# Patient Record
Sex: FEMALE | Race: WHITE | HISPANIC OR LATINO
[De-identification: names, ages, dates, MRNs, and addresses within clinical notes are randomized per-mention and may not be internally consistent; named-entity substitution may affect disease eponyms.]

---

## 2018-04-21 PROBLEM — Z00.00 ENCOUNTER FOR PREVENTIVE HEALTH EXAMINATION: Status: ACTIVE | Noted: 2018-04-21

## 2018-05-21 ENCOUNTER — ASOB RESULT (OUTPATIENT)
Age: 28
End: 2018-05-21

## 2018-05-21 ENCOUNTER — APPOINTMENT (OUTPATIENT)
Dept: OBGYN | Facility: CLINIC | Age: 28
End: 2018-05-21
Payer: MEDICAID

## 2018-05-21 VITALS
WEIGHT: 150 LBS | DIASTOLIC BLOOD PRESSURE: 70 MMHG | BODY MASS INDEX: 29.45 KG/M2 | HEIGHT: 60 IN | SYSTOLIC BLOOD PRESSURE: 130 MMHG

## 2018-05-21 DIAGNOSIS — N92.6 IRREGULAR MENSTRUATION, UNSPECIFIED: ICD-10-CM

## 2018-05-21 DIAGNOSIS — Z87.59 PERSONAL HISTORY OF OTHER COMPLICATIONS OF PREGNANCY, CHILDBIRTH AND THE PUERPERIUM: ICD-10-CM

## 2018-05-21 DIAGNOSIS — F19.90 OTHER PSYCHOACTIVE SUBSTANCE USE, UNSPECIFIED, UNCOMPLICATED: ICD-10-CM

## 2018-05-21 PROCEDURE — 76857 US EXAM PELVIC LIMITED: CPT

## 2018-05-21 PROCEDURE — 99385 PREV VISIT NEW AGE 18-39: CPT

## 2018-05-21 PROCEDURE — 99213 OFFICE O/P EST LOW 20 MIN: CPT | Mod: 25

## 2018-05-21 PROCEDURE — 76830 TRANSVAGINAL US NON-OB: CPT

## 2018-05-21 PROCEDURE — XXXXX: CPT

## 2018-05-21 RX ORDER — ALBUTEROL SULFATE 90 UG/1
108 (90 BASE) AEROSOL, METERED RESPIRATORY (INHALATION)
Refills: 0 | Status: ACTIVE | COMMUNITY

## 2018-05-22 LAB — HPV HIGH+LOW RISK DNA PNL CVX: NOT DETECTED

## 2018-05-25 LAB — CYTOLOGY CVX/VAG DOC THIN PREP: NORMAL

## 2018-06-01 ENCOUNTER — APPOINTMENT (OUTPATIENT)
Dept: OBGYN | Facility: CLINIC | Age: 28
End: 2018-06-01
Payer: MEDICAID

## 2018-06-01 VITALS — WEIGHT: 150 LBS | BODY MASS INDEX: 27.6 KG/M2 | HEIGHT: 62 IN

## 2018-06-01 PROCEDURE — 99214 OFFICE O/P EST MOD 30 MIN: CPT

## 2018-07-16 ENCOUNTER — OTHER (OUTPATIENT)
Age: 28
End: 2018-07-16

## 2018-09-05 ENCOUNTER — APPOINTMENT (OUTPATIENT)
Dept: OBGYN | Facility: CLINIC | Age: 28
End: 2018-09-05
Payer: MEDICAID

## 2018-09-05 VITALS — BODY MASS INDEX: 27.6 KG/M2 | WEIGHT: 150 LBS | HEIGHT: 62 IN

## 2018-09-05 PROCEDURE — 99213 OFFICE O/P EST LOW 20 MIN: CPT

## 2018-09-20 ENCOUNTER — APPOINTMENT (OUTPATIENT)
Dept: OBGYN | Facility: CLINIC | Age: 28
End: 2018-09-20
Payer: MEDICAID

## 2018-09-20 VITALS
SYSTOLIC BLOOD PRESSURE: 102 MMHG | DIASTOLIC BLOOD PRESSURE: 68 MMHG | HEIGHT: 62 IN | BODY MASS INDEX: 34.6 KG/M2 | WEIGHT: 188 LBS

## 2018-09-20 DIAGNOSIS — N91.1 SECONDARY AMENORRHEA: ICD-10-CM

## 2018-09-20 LAB
HCG UR QL: POSITIVE
QUALITY CONTROL: YES

## 2018-09-20 PROCEDURE — 81025 URINE PREGNANCY TEST: CPT

## 2018-09-20 PROCEDURE — 99213 OFFICE O/P EST LOW 20 MIN: CPT

## 2018-09-28 ENCOUNTER — APPOINTMENT (OUTPATIENT)
Dept: OBGYN | Facility: CLINIC | Age: 28
End: 2018-09-28
Payer: MEDICAID

## 2018-09-28 ENCOUNTER — ASOB RESULT (OUTPATIENT)
Age: 28
End: 2018-09-28

## 2018-09-28 VITALS
WEIGHT: 188 LBS | SYSTOLIC BLOOD PRESSURE: 110 MMHG | HEIGHT: 62 IN | DIASTOLIC BLOOD PRESSURE: 88 MMHG | BODY MASS INDEX: 34.6 KG/M2

## 2018-09-28 DIAGNOSIS — O26.20 PREGNANCY CARE FOR PATIENT WITH RECURRENT PREGNANCY LOSS, UNSPECIFIED TRIMESTER: ICD-10-CM

## 2018-09-28 PROCEDURE — 99213 OFFICE O/P EST LOW 20 MIN: CPT

## 2018-10-03 PROBLEM — O26.20 HABITUAL ABORTION HISTORY, ANTEPARTUM: Status: ACTIVE | Noted: 2018-10-03

## 2018-10-26 ENCOUNTER — APPOINTMENT (OUTPATIENT)
Dept: OBGYN | Facility: CLINIC | Age: 28
End: 2018-10-26
Payer: MEDICAID

## 2018-10-26 DIAGNOSIS — N30.00 ACUTE CYSTITIS W/OUT HEMATURIA: ICD-10-CM

## 2018-10-26 PROCEDURE — 81003 URINALYSIS AUTO W/O SCOPE: CPT | Mod: QW

## 2018-10-26 PROCEDURE — 99213 OFFICE O/P EST LOW 20 MIN: CPT

## 2018-10-27 LAB
BILIRUB UR QL STRIP: NORMAL
CLARITY UR: CLEAR
COLLECTION METHOD: NORMAL
GLUCOSE UR-MCNC: NORMAL
HCG UR QL: 0.2 EU/DL
HGB UR QL STRIP.AUTO: NORMAL
KETONES UR-MCNC: NORMAL
LEUKOCYTE ESTERASE UR QL STRIP: NORMAL
NITRITE UR QL STRIP: NORMAL
PH UR STRIP: 7.5
PROT UR STRIP-MCNC: NORMAL
SP GR UR STRIP: 1.02

## 2018-11-09 ENCOUNTER — APPOINTMENT (OUTPATIENT)
Dept: OBGYN | Facility: CLINIC | Age: 28
End: 2018-11-09

## 2019-04-26 ENCOUNTER — APPOINTMENT (OUTPATIENT)
Dept: OBGYN | Facility: CLINIC | Age: 29
End: 2019-04-26
Payer: MEDICAID

## 2019-04-26 PROCEDURE — 36415 COLL VENOUS BLD VENIPUNCTURE: CPT

## 2019-04-29 LAB
HCG SERPL QL: POSITIVE
PAPP-A SERPL-ACNC: 642 MIU/ML

## 2019-06-03 ENCOUNTER — APPOINTMENT (OUTPATIENT)
Dept: OBGYN | Facility: CLINIC | Age: 29
End: 2019-06-03
Payer: MEDICAID

## 2019-06-03 VITALS
BODY MASS INDEX: 35.51 KG/M2 | SYSTOLIC BLOOD PRESSURE: 110 MMHG | HEIGHT: 62 IN | WEIGHT: 193 LBS | DIASTOLIC BLOOD PRESSURE: 62 MMHG

## 2019-06-03 PROCEDURE — 36415 COLL VENOUS BLD VENIPUNCTURE: CPT

## 2019-06-03 PROCEDURE — 99214 OFFICE O/P EST MOD 30 MIN: CPT | Mod: 25

## 2019-06-03 PROCEDURE — 76817 TRANSVAGINAL US OBSTETRIC: CPT

## 2019-06-05 LAB
HCG SERPL QL: POSITIVE
PAPP-A SERPL-ACNC: 54 MIU/ML

## 2019-06-05 NOTE — DISCUSSION/SUMMARY
[FreeTextEntry1] : a28-year-old  female came to the office for confirmation of pregnancy patient had multiple miscarriages and an ectopic pregnancy on exam today the patient has no abdominal tenderness states that she has been bleeding for the last 3 days with cramping ultrasound showed an empty uterus no masses on the adnexa but there is free fluid advised the patient that the mother beta-hCG needs to be done and she Z. of either having a complete AB or an ectopic blood drawn today was managed accordingly instructions given no heavy lifting and no sexual relations I spent 25 minutes with

## 2019-06-14 ENCOUNTER — APPOINTMENT (OUTPATIENT)
Dept: OBGYN | Facility: CLINIC | Age: 29
End: 2019-06-14

## 2019-06-19 ENCOUNTER — APPOINTMENT (OUTPATIENT)
Dept: GYNECOLOGIC ONCOLOGY | Facility: CLINIC | Age: 29
End: 2019-06-19
Payer: MEDICAID

## 2019-06-19 ENCOUNTER — INPATIENT (INPATIENT)
Facility: HOSPITAL | Age: 29
LOS: 4 days | Discharge: HOME | DRG: 818 | End: 2019-06-24
Attending: OBSTETRICS & GYNECOLOGY | Admitting: OBSTETRICS & GYNECOLOGY
Payer: MEDICAID

## 2019-06-19 VITALS
WEIGHT: 192.9 LBS | OXYGEN SATURATION: 100 % | SYSTOLIC BLOOD PRESSURE: 119 MMHG | RESPIRATION RATE: 22 BRPM | HEIGHT: 60 IN | TEMPERATURE: 99 F | HEART RATE: 91 BPM | DIASTOLIC BLOOD PRESSURE: 86 MMHG

## 2019-06-19 DIAGNOSIS — Z83.3 FAMILY HISTORY OF DIABETES MELLITUS: ICD-10-CM

## 2019-06-19 DIAGNOSIS — N83.202 UNSPECIFIED OVARIAN CYST, LEFT SIDE: ICD-10-CM

## 2019-06-19 PROCEDURE — ZZZZZ: CPT

## 2019-06-19 PROCEDURE — 99285 EMERGENCY DEPT VISIT HI MDM: CPT | Mod: 25

## 2019-06-19 RX ORDER — CLOMIPHENE CITRATE 50 MG/1
50 TABLET ORAL
Qty: 5 | Refills: 3 | Status: DISCONTINUED | COMMUNITY
Start: 2018-09-05 | End: 2019-06-19

## 2019-06-19 RX ORDER — METFORMIN HYDROCHLORIDE 500 MG/1
500 TABLET, COATED ORAL DAILY
Qty: 90 | Refills: 3 | Status: DISCONTINUED | COMMUNITY
Start: 2018-06-01 | End: 2019-06-19

## 2019-06-19 RX ORDER — CIPROFLOXACIN HYDROCHLORIDE 500 MG/1
500 TABLET, FILM COATED ORAL TWICE DAILY
Qty: 10 | Refills: 1 | Status: DISCONTINUED | COMMUNITY
Start: 2018-10-26 | End: 2019-06-19

## 2019-06-19 RX ORDER — CHLORHEXIDINE GLUCONATE 4 %
325 (65 FE) LIQUID (ML) TOPICAL DAILY
Qty: 90 | Refills: 3 | Status: DISCONTINUED | COMMUNITY
Start: 2018-09-28 | End: 2019-06-19

## 2019-06-19 RX ORDER — CLOMIPHENE CITRATE 50 MG/1
50 TABLET ORAL
Qty: 5 | Refills: 0 | Status: DISCONTINUED | COMMUNITY
Start: 2018-06-01 | End: 2019-06-19

## 2019-06-19 NOTE — ED ADULT TRIAGE NOTE - CHIEF COMPLAINT QUOTE
Hx of enlarged left ovary diagnosed one week ago, told to follow up with GYN, but to come back if pain worsens

## 2019-06-20 ENCOUNTER — FORM ENCOUNTER (OUTPATIENT)
Age: 29
End: 2019-06-20

## 2019-06-20 DIAGNOSIS — N83.9 NONINFLAMMATORY DISORDER OF OVARY, FALLOPIAN TUBE AND BROAD LIGAMENT, UNSPECIFIED: ICD-10-CM

## 2019-06-20 LAB
ALBUMIN SERPL ELPH-MCNC: 4.4 G/DL — SIGNIFICANT CHANGE UP (ref 3.3–5.2)
ALP SERPL-CCNC: 67 U/L — SIGNIFICANT CHANGE UP (ref 40–120)
ALT FLD-CCNC: 15 U/L — SIGNIFICANT CHANGE UP
ANION GAP SERPL CALC-SCNC: 11 MMOL/L — SIGNIFICANT CHANGE UP (ref 5–17)
ANION GAP SERPL CALC-SCNC: 13 MMOL/L — SIGNIFICANT CHANGE UP (ref 5–17)
APPEARANCE UR: CLEAR — SIGNIFICANT CHANGE UP
APTT BLD: 36.9 SEC — HIGH (ref 27.5–36.3)
AST SERPL-CCNC: 14 U/L — SIGNIFICANT CHANGE UP
BASOPHILS # BLD AUTO: 0 K/UL — SIGNIFICANT CHANGE UP (ref 0–0.2)
BASOPHILS # BLD AUTO: 0 K/UL — SIGNIFICANT CHANGE UP (ref 0–0.2)
BASOPHILS NFR BLD AUTO: 0.2 % — SIGNIFICANT CHANGE UP (ref 0–2)
BASOPHILS NFR BLD AUTO: 0.3 % — SIGNIFICANT CHANGE UP (ref 0–2)
BILIRUB SERPL-MCNC: 0.3 MG/DL — LOW (ref 0.4–2)
BILIRUB UR-MCNC: NEGATIVE — SIGNIFICANT CHANGE UP
BLD GP AB SCN SERPL QL: SIGNIFICANT CHANGE UP
BUN SERPL-MCNC: 10 MG/DL — SIGNIFICANT CHANGE UP (ref 8–20)
BUN SERPL-MCNC: 8 MG/DL — SIGNIFICANT CHANGE UP (ref 8–20)
CALCIUM SERPL-MCNC: 9.1 MG/DL — SIGNIFICANT CHANGE UP (ref 8.6–10.2)
CALCIUM SERPL-MCNC: 9.6 MG/DL — SIGNIFICANT CHANGE UP (ref 8.6–10.2)
CANCER AG125 SERPL-ACNC: 38 U/ML — SIGNIFICANT CHANGE UP
CANCER AG19-9 SERPL-ACNC: 3 U/ML — SIGNIFICANT CHANGE UP
CEA SERPL-MCNC: <0.6 NG/ML — SIGNIFICANT CHANGE UP (ref 0–3.8)
CHLORIDE SERPL-SCNC: 102 MMOL/L — SIGNIFICANT CHANGE UP (ref 98–107)
CHLORIDE SERPL-SCNC: 102 MMOL/L — SIGNIFICANT CHANGE UP (ref 98–107)
CO2 SERPL-SCNC: 23 MMOL/L — SIGNIFICANT CHANGE UP (ref 22–29)
CO2 SERPL-SCNC: 25 MMOL/L — SIGNIFICANT CHANGE UP (ref 22–29)
COLOR SPEC: YELLOW — SIGNIFICANT CHANGE UP
CREAT SERPL-MCNC: 0.44 MG/DL — LOW (ref 0.5–1.3)
CREAT SERPL-MCNC: 0.51 MG/DL — SIGNIFICANT CHANGE UP (ref 0.5–1.3)
DENV1 AB SER-ACNC: 146 UG/DL — SIGNIFICANT CHANGE UP (ref 65–380)
DIFF PNL FLD: ABNORMAL
EOSINOPHIL # BLD AUTO: 0.2 K/UL — SIGNIFICANT CHANGE UP (ref 0–0.5)
EOSINOPHIL # BLD AUTO: 0.3 K/UL — SIGNIFICANT CHANGE UP (ref 0–0.5)
EOSINOPHIL NFR BLD AUTO: 2.3 % — SIGNIFICANT CHANGE UP (ref 0–6)
EOSINOPHIL NFR BLD AUTO: 2.8 % — SIGNIFICANT CHANGE UP (ref 0–6)
EPI CELLS # UR: SIGNIFICANT CHANGE UP
GLUCOSE SERPL-MCNC: 77 MG/DL — SIGNIFICANT CHANGE UP (ref 70–115)
GLUCOSE SERPL-MCNC: 84 MG/DL — SIGNIFICANT CHANGE UP (ref 70–115)
GLUCOSE UR QL: NEGATIVE MG/DL — SIGNIFICANT CHANGE UP
HCG SERPL-ACNC: <4 MIU/ML — SIGNIFICANT CHANGE UP
HCG UR QL: NEGATIVE — SIGNIFICANT CHANGE UP
HCT VFR BLD CALC: 36.6 % — LOW (ref 37–47)
HCT VFR BLD CALC: 37.8 % — SIGNIFICANT CHANGE UP (ref 37–47)
HGB BLD-MCNC: 12 G/DL — SIGNIFICANT CHANGE UP (ref 12–16)
HGB BLD-MCNC: 12.2 G/DL — SIGNIFICANT CHANGE UP (ref 12–16)
INR BLD: 1.07 RATIO — SIGNIFICANT CHANGE UP (ref 0.88–1.16)
KETONES UR-MCNC: NEGATIVE — SIGNIFICANT CHANGE UP
LDH SERPL L TO P-CCNC: 165 U/L — SIGNIFICANT CHANGE UP (ref 98–192)
LEUKOCYTE ESTERASE UR-ACNC: NEGATIVE — SIGNIFICANT CHANGE UP
LYMPHOCYTES # BLD AUTO: 2.2 K/UL — SIGNIFICANT CHANGE UP (ref 1–4.8)
LYMPHOCYTES # BLD AUTO: 21.9 % — SIGNIFICANT CHANGE UP (ref 20–55)
LYMPHOCYTES # BLD AUTO: 26.7 % — SIGNIFICANT CHANGE UP (ref 20–55)
LYMPHOCYTES # BLD AUTO: 3 K/UL — SIGNIFICANT CHANGE UP (ref 1–4.8)
MCHC RBC-ENTMCNC: 29.3 PG — SIGNIFICANT CHANGE UP (ref 27–31)
MCHC RBC-ENTMCNC: 29.7 PG — SIGNIFICANT CHANGE UP (ref 27–31)
MCHC RBC-ENTMCNC: 32.3 G/DL — SIGNIFICANT CHANGE UP (ref 32–36)
MCHC RBC-ENTMCNC: 32.8 G/DL — SIGNIFICANT CHANGE UP (ref 32–36)
MCV RBC AUTO: 90.6 FL — SIGNIFICANT CHANGE UP (ref 81–99)
MCV RBC AUTO: 90.9 FL — SIGNIFICANT CHANGE UP (ref 81–99)
MONOCYTES # BLD AUTO: 0.5 K/UL — SIGNIFICANT CHANGE UP (ref 0–0.8)
MONOCYTES # BLD AUTO: 0.5 K/UL — SIGNIFICANT CHANGE UP (ref 0–0.8)
MONOCYTES NFR BLD AUTO: 4.2 % — SIGNIFICANT CHANGE UP (ref 3–10)
MONOCYTES NFR BLD AUTO: 4.6 % — SIGNIFICANT CHANGE UP (ref 3–10)
NEUTROPHILS # BLD AUTO: 7.1 K/UL — SIGNIFICANT CHANGE UP (ref 1.8–8)
NEUTROPHILS # BLD AUTO: 7.4 K/UL — SIGNIFICANT CHANGE UP (ref 1.8–8)
NEUTROPHILS NFR BLD AUTO: 65.7 % — SIGNIFICANT CHANGE UP (ref 37–73)
NEUTROPHILS NFR BLD AUTO: 70.8 % — SIGNIFICANT CHANGE UP (ref 37–73)
NITRITE UR-MCNC: NEGATIVE — SIGNIFICANT CHANGE UP
PH UR: 7 — SIGNIFICANT CHANGE UP (ref 5–8)
PLATELET # BLD AUTO: 400 K/UL — SIGNIFICANT CHANGE UP (ref 150–400)
PLATELET # BLD AUTO: 405 K/UL — HIGH (ref 150–400)
POTASSIUM SERPL-MCNC: 4.2 MMOL/L — SIGNIFICANT CHANGE UP (ref 3.5–5.3)
POTASSIUM SERPL-MCNC: 4.2 MMOL/L — SIGNIFICANT CHANGE UP (ref 3.5–5.3)
POTASSIUM SERPL-SCNC: 4.2 MMOL/L — SIGNIFICANT CHANGE UP (ref 3.5–5.3)
POTASSIUM SERPL-SCNC: 4.2 MMOL/L — SIGNIFICANT CHANGE UP (ref 3.5–5.3)
PROT SERPL-MCNC: 8 G/DL — SIGNIFICANT CHANGE UP (ref 6.6–8.7)
PROT UR-MCNC: NEGATIVE MG/DL — SIGNIFICANT CHANGE UP
PROTHROM AB SERPL-ACNC: 12.3 SEC — SIGNIFICANT CHANGE UP (ref 10–12.9)
RBC # BLD: 4.04 M/UL — LOW (ref 4.4–5.2)
RBC # BLD: 4.16 M/UL — LOW (ref 4.4–5.2)
RBC # FLD: 13 % — SIGNIFICANT CHANGE UP (ref 11–15.6)
RBC # FLD: 13 % — SIGNIFICANT CHANGE UP (ref 11–15.6)
RBC CASTS # UR COMP ASSIST: SIGNIFICANT CHANGE UP /HPF (ref 0–4)
SODIUM SERPL-SCNC: 138 MMOL/L — SIGNIFICANT CHANGE UP (ref 135–145)
SODIUM SERPL-SCNC: 138 MMOL/L — SIGNIFICANT CHANGE UP (ref 135–145)
SP GR SPEC: 1 — LOW (ref 1.01–1.02)
UROBILINOGEN FLD QL: NEGATIVE MG/DL — SIGNIFICANT CHANGE UP
WBC # BLD: 10 K/UL — SIGNIFICANT CHANGE UP (ref 4.8–10.8)
WBC # BLD: 11.3 K/UL — HIGH (ref 4.8–10.8)
WBC # FLD AUTO: 10 K/UL — SIGNIFICANT CHANGE UP (ref 4.8–10.8)
WBC # FLD AUTO: 11.3 K/UL — HIGH (ref 4.8–10.8)
WBC UR QL: NEGATIVE — SIGNIFICANT CHANGE UP

## 2019-06-20 PROCEDURE — 76856 US EXAM PELVIC COMPLETE: CPT | Mod: 26

## 2019-06-20 PROCEDURE — 99232 SBSQ HOSP IP/OBS MODERATE 35: CPT | Mod: 57

## 2019-06-20 PROCEDURE — 72197 MRI PELVIS W/O & W/DYE: CPT | Mod: 26

## 2019-06-20 PROCEDURE — 76830 TRANSVAGINAL US NON-OB: CPT | Mod: 26

## 2019-06-20 PROCEDURE — 99223 1ST HOSP IP/OBS HIGH 75: CPT

## 2019-06-20 RX ORDER — SODIUM CHLORIDE 9 MG/ML
1000 INJECTION, SOLUTION INTRAVENOUS
Refills: 0 | Status: DISCONTINUED | OUTPATIENT
Start: 2019-06-20 | End: 2019-06-21

## 2019-06-20 RX ORDER — MULTIVIT WITH MIN/MFOLATE/K2 340-15/3 G
1 POWDER (GRAM) ORAL ONCE
Refills: 0 | Status: COMPLETED | OUTPATIENT
Start: 2019-06-20 | End: 2019-06-21

## 2019-06-20 RX ORDER — ACETAMINOPHEN 500 MG
650 TABLET ORAL ONCE
Refills: 0 | Status: COMPLETED | OUTPATIENT
Start: 2019-06-20 | End: 2019-06-20

## 2019-06-20 RX ORDER — MULTIVIT WITH MIN/MFOLATE/K2 340-15/3 G
1 POWDER (GRAM) ORAL ONCE
Refills: 0 | Status: COMPLETED | OUTPATIENT
Start: 2019-06-20 | End: 2019-06-20

## 2019-06-20 RX ORDER — SODIUM CHLORIDE 9 MG/ML
1000 INJECTION, SOLUTION INTRAVENOUS
Refills: 0 | Status: DISCONTINUED | OUTPATIENT
Start: 2019-06-20 | End: 2019-06-23

## 2019-06-20 RX ORDER — MORPHINE SULFATE 50 MG/1
2 CAPSULE, EXTENDED RELEASE ORAL EVERY 4 HOURS
Refills: 0 | Status: DISCONTINUED | OUTPATIENT
Start: 2019-06-20 | End: 2019-06-24

## 2019-06-20 RX ORDER — KETOROLAC TROMETHAMINE 30 MG/ML
15 SYRINGE (ML) INJECTION EVERY 6 HOURS
Refills: 0 | Status: DISCONTINUED | OUTPATIENT
Start: 2019-06-20 | End: 2019-06-23

## 2019-06-20 RX ADMIN — Medication 650 MILLIGRAM(S): at 13:34

## 2019-06-20 RX ADMIN — MORPHINE SULFATE 2 MILLIGRAM(S): 50 CAPSULE, EXTENDED RELEASE ORAL at 03:27

## 2019-06-20 RX ADMIN — Medication 15 MILLIGRAM(S): at 08:11

## 2019-06-20 RX ADMIN — Medication 650 MILLIGRAM(S): at 12:34

## 2019-06-20 RX ADMIN — Medication 1 BOTTLE: at 20:07

## 2019-06-20 RX ADMIN — SODIUM CHLORIDE 125 MILLILITER(S): 9 INJECTION, SOLUTION INTRAVENOUS at 03:27

## 2019-06-20 RX ADMIN — SODIUM CHLORIDE 125 MILLILITER(S): 9 INJECTION, SOLUTION INTRAVENOUS at 12:33

## 2019-06-20 RX ADMIN — Medication 15 MILLIGRAM(S): at 07:56

## 2019-06-20 NOTE — PROGRESS NOTE ADULT - SUBJECTIVE AND OBJECTIVE BOX
GYNECOLOGIC ONCOLOGY PROGRESS NOTE    HOD#1    PROBLEMS:      Pt seen and examined at bedside. US report revealing large left adnexal mass measuring 17.1 cm likely arising from the left ovary, ovarian torsion cannot be excluded. MRI recommended. Patient reports some vaginal spotting when going to the restroom. Patient also reports mild headache at this time.       SUBJECTIVE:    Patient is without complaints.  Pain well-controlled.  Flatus: Yes   Denies Nausea, Vomiting or Diarrhea.  Denies shortness of breath, chest pain or dyspnea on exertion.  Denies blurry vision or lightheadedness.   NPO    OBJECTIVE:     VITALS:  T(F): 98.7 (19 @ 07:36), Max: 98.7 (19 @ 07:36)  HR: 68 (19 @ 07:36) (68 - 91)  BP: 120/78 (19 @ 07:36) (111/62 - 120/78)  RR: 18 (19 @ 07:36) (18 - 22)  SpO2: 100% (19 @ 07:36) (97% - 100%)      I&O's Summary      MEDICATIONS  (STANDING):  acetaminophen   Tablet .. 650 milliGRAM(s) Oral once  lactated ringers. 1000 milliLiter(s) (125 mL/Hr) IV Continuous <Continuous>    MEDICATIONS  (PRN):  ketorolac   Injectable 15 milliGRAM(s) IV Push every 6 hours PRN Moderate Pain (4 - 6)  morphine  - Injectable 2 milliGRAM(s) IV Push every 4 hours PRN Severe Pain (7 - 10)      Physical Exam:  Constitutional: NAD  Pulmonary: clear to auscultation bilaterally   Cardiovascular: Regular rate and rhythm   Abdomen: soft, tenderness appreciated on palpation of the LLQ, non-distended, normal bowel sounds  Extremities: no lower extremity edema or calve tenderness, Porter's sign negative.      LABS:                        12.0   11.3  )-----------( 405      ( 2019 03:17 )             36.6     -    138  |  102  |  10.0  ----------------------------<  84  4.2   |  23.0  |  0.51    Ca    9.1      2019 03:17    TPro  8.0  /  Alb  4.4  /  TBili  0.3<L>  /  DBili  x   /  AST  14  /  ALT  15  /  AlkPhos  67  06-20    PT/INR - ( 2019 03:17 )   PT: 12.3 sec;   INR: 1.07 ratio         PTT - ( 2019 03:17 )  PTT:36.9 sec  Urinalysis Basic - ( 2019 03:22 )    Color: Yellow / Appearance: Clear / S.005 / pH: x  Gluc: x / Ketone: Negative  / Bili: Negative / Urobili: Negative mg/dL   Blood: x / Protein: Negative mg/dL / Nitrite: Negative   Leuk Esterase: Negative / RBC: 0-2 /HPF / WBC Negative   Sq Epi: x / Non Sq Epi: Occasional / Bacteria: x

## 2019-06-20 NOTE — CONSULT NOTE ADULT - SUBJECTIVE AND OBJECTIVE BOX
HPI:  27 yo  here for left lower quadrant on-and-off pain that has been going on for about 1 month. Reports that pain got worse tonight. She was seen by Dr. Rodriguez today in the office and a complex cystic mass was seen in the left adnexa on US and was recommended to come in for further workup.   From note from office today:  19 US revealed fibroid uterus, complex cystic mass seen anterior left = 97 x 65 x 81mm - minimal blood flow seen within surrounding tissue. Cystic structure seen lateral to mass = 69 x 26 x 58mm.  She was previously seen by other Gyn and was found to have + beta hcg that was downtrending. Pt reports no vaginal bleeding at this time, but just some spotting with wiping.       Gyn hx: menarche at 13 yo, history of monthly periods denies any STI, denies hx of abnormal pap smear   Last pap: may 2018 - NILM (2019 03:11)      PAST MEDICAL & SURGICAL HISTORY:  Enlarged ovary  Asthma    No significant past surgical history      Medications:      Allergies    No Known Allergies    Intolerances        SOCIAL HISTORY:  deniers habits  FAMILY HISTORY:  Family history of diabetes mellitus      Vital Signs Last 24 Hrs  T(C): 36.9 (2019 16:35), Max: 37.1 (2019 07:36)  T(F): 98.5 (2019 16:35), Max: 98.7 (2019 07:36)  HR: 70 (2019 16:35) (61 - 91)  BP: 105/71 (2019 16:35) (104/66 - 120/78)  BP(mean): --  RR: 18 (2019 16:35) (18 - 22)  SpO2: 98% (2019 16:35) (97% - 100%)    REVIEW OF SYSTEMS:    CONSTITUTIONAL: No fever, weight loss, or fatigue  EYES: No eye pain, visual disturbances, or discharge  ENMT:  No difficulty hearing, tinnitus, vertigo; No sinus or throat pain  NECK: No pain or stiffness  BREASTS: No pain, masses, or nipple discharge  RESPIRATORY: No cough, wheezing, chills or hemoptysis; No shortness of breath  CARDIOVASCULAR: No chest pain, palpitations, dizziness, or leg swelling  GASTROINTESTINAL: No abdominal or epigastric pain. No nausea, vomiting, or hematemesis; No diarrhea or constipation. No melena or hematochezia.  GENITOURINARY: No dysuria, frequency, hematuria, or incontinence  NEUROLOGICAL: No headaches, memory loss, loss of strength, numbness, or tremors  SKIN: No itching, burning, rashes, or lesions   LYMPH NODES: No enlarged glands  ENDOCRINE: No heat or cold intolerance; No hair loss  MUSCULOSKELETAL: No joint pain or swelling; No muscle, back, or extremity pain  PSYCHIATRIC: No depression, anxiety, mood swings, or difficulty sleeping  HEME/LYMPH: No easy bruising, or bleeding gums  ALLERY AND IMMUNOLOGIC: No hives or eczema    PHYSICAL EXAM:    Constitutional: NAD, well groomed and developed  Pulmonary: clear to auscultation bilaterally   Cardiovascular: Regular rate and rhythm   Abdomen: soft, tenderness appreciated on palpation of the LLQ, non-distended, normal bowel sounds  Extremities: no lower extremity edema or calf tenderness,      LABS:                        12.2   10.0  )-----------( 400      ( 2019 12:19 )             37.8     06-20    138  |  102  |  8.0  ----------------------------<  77  4.2   |  25.0  |  0.44<L>    Ca    9.6      2019 12:19    TPro  8.0  /  Alb  4.4  /  TBili  0.3<L>  /  DBili  x   /  AST  14  /  ALT  15  /  AlkPhos  67  06-20    PT/INR - ( 2019 03:17 )   PT: 12.3 sec;   INR: 1.07 ratio         PTT - ( 2019 03:17 )  PTT:36.9 sec  Urinalysis Basic - ( 2019 03:22 )    Color: Yellow / Appearance: Clear / S.005 / pH: x  Gluc: x / Ketone: Negative  / Bili: Negative / Urobili: Negative mg/dL   Blood: x / Protein: Negative mg/dL / Nitrite: Negative   Leuk Esterase: Negative / RBC: 0-2 /HPF / WBC Negative   Sq Epi: x / Non Sq Epi: Occasional / Bacteria: x        RADIOLOGY & ADDITIONAL STUDIES:    Assessment & Plan:

## 2019-06-20 NOTE — H&P ADULT - ASSESSMENT
27 yo with known ovarian mass and increasing LLQ pain with concern for torsion. Will admit to Dr. Rodriguez as previously discussed with him    Ovarian mass vs torsion  -TVUS to look at adnexa size and blood flow  - NPO   - IV pain meds  - type and screen for potential OR    Hx of + beta hCG  - repeat quantitative hCG and UCG      Prophylaxis:   - SCD for now. Will switch to lovenox vs heparin if not going to OR

## 2019-06-20 NOTE — ED PROVIDER NOTE - OBJECTIVE STATEMENT
27 y/o F pt with PMHx of enlarged ovary, asthma presents to the ED c/o . Sent to ED by Dr. Rodriguez ([id number]) to r/o torsion. Pt denies fevers/chills, ha, loc, focal neuro deficits, cp/sob/palp, cough, abd pain/n/v/d, urinary symptoms, recent travel and sick contacts. No further acute complaints at this time.  Dr. Rodriguez [phone] 27 y/o F pt with PMHx of enlarged ovary, asthma, ectopic pregnancy, 2 miscarriages presents to the ED c/o L pelvic pain for past month. Pt was followed by Dr. Rodriguez ([id number]) to r/o torsion after ultrasound showed an enlarged ovary with mass, unknown if  benign or malignant. Upon getting result was told to go to ED. Pain is constant, at times getting worse. Pt denies fevers/chills, ha, loc, focal neuro deficits, cp/sob/palp, cough, n/v/d, urinary symptoms, recent travel and sick contacts. No further acute complaints at this time.

## 2019-06-20 NOTE — ED ADULT NURSE NOTE - OBJECTIVE STATEMENT
Assumed pt care, pt lying in stretcher, no acute distress noted. Pt complaining of 10/10 LLQ pain, sent by OB/GYN for r/o Torsion. Pt states she has csyst on the L ovary and told to come to ER if pain does not subside. Pt LLQ tender to palpation, guarding, pt states she has noted vaginal bleeding, "moderate amount" for 1 hour. Assumed pt care, pt lying in stretcher, no acute distress noted. Pt complaining of 10/10 LLQ pain, sent by OB/GYN for r/o Torsion. Pt states she has "large" L ovary and told to come to ER if pain does not subside. Pt LLQ tender to palpation, guarding, pt states she has noted vaginal bleeding, "moderate amount" for 1 hour.

## 2019-06-20 NOTE — ED ADULT NURSE NOTE - NSIMPLEMENTINTERV_GEN_ALL_ED
Implemented All Universal Safety Interventions:  [place] to call system. Call bell, personal items and telephone within reach. Instruct patient to call for assistance. Room bathroom lighting operational. Non-slip footwear when patient is off stretcher. Physically safe environment: no spills, clutter or unnecessary equipment. Stretcher in lowest position, wheels locked, appropriate side rails in place.

## 2019-06-20 NOTE — ED PROVIDER NOTE - CLINICAL SUMMARY MEDICAL DECISION MAKING FREE TEXT BOX
27 y/o F pt with PMHx of enlarged ovary, asthma, ectopic pregnancy, 2 miscarriages presents to the ED c/o L pelvic pain for past month. 29 y/o F pt with PMHx of enlarged ovary, asthma, ectopic pregnancy, 2 miscarriages presents to the ED c/o L pelvic pain for past month - known left ovarian mass -  admit for further gyn management

## 2019-06-20 NOTE — ED ADULT NURSE REASSESSMENT NOTE - NS ED NURSE REASSESS COMMENT FT1
Pt handed off to RN GEORGIE in stable condition. Pt oriented to unit, plan of care explained. Call bell given to pt and call bell system explained to pt. No apparent distress noted at this time.

## 2019-06-20 NOTE — ED PROVIDER NOTE - CHPI ED SYMPTOMS NEG
no chills/no vomiting/no fever/no HA, LOC, focal neuro deficits, cough, CP/SOB/palp, abd pain/no nausea

## 2019-06-20 NOTE — H&P ADULT - NSHPPHYSICALEXAM_GEN_ALL_CORE
T(F): 97.9 (06-20-19 @ 02:26), Max: 98.6 (06-19-19 @ 23:43)  HR: 86 (06-20-19 @ 02:26) (86 - 91)  BP: 112/72 (06-20-19 @ 02:26) (112/72 - 119/86)  RR: 18 (06-20-19 @ 02:26) (18 - 22)  SpO2: 100% (06-20-19 @ 02:26) (100% - 100%)      Gen: comfortable appearing laying in bed, has difficulty sitting up due to pain  Adnexa: bandage on left adnexa with one draining sebaceous cyst   Cardio: RRR  Pulm: CTABL  Abd: soft, non-distended, has LLQ tenderness to light palpation, no rebound or guarding, + BS  Ext: difficulty lifting left leg secondary to abdominal pain, no difficulty lifting right leg, no swelling or edema noted   Pelvic: Deferred T(F): 97.9 (06-20-19 @ 02:26), Max: 98.6 (06-19-19 @ 23:43)  HR: 86 (06-20-19 @ 02:26) (86 - 91)  BP: 112/72 (06-20-19 @ 02:26) (112/72 - 119/86)  RR: 18 (06-20-19 @ 02:26) (18 - 22)  SpO2: 100% (06-20-19 @ 02:26) (100% - 100%)      Gen: comfortable appearing laying in bed, has difficulty sitting up due to pain  Neuro: A&Ox3  Adnexa: bandage on left adnexa with one draining sebaceous cyst   Cardio: RRR  Pulm: CTABL  Abd: soft, non-distended, has LLQ tenderness to light palpation, no rebound or guarding, + BS  Ext: difficulty lifting left leg secondary to abdominal pain, no difficulty lifting right leg, no swelling or edema noted   Pelvic: Deferred

## 2019-06-20 NOTE — H&P ADULT - HISTORY OF PRESENT ILLNESS
29 yo  here for left lower quadrant on-and-off pain that has been going on for about 1 month. Reports that pain got worse tonight. She was seen by Dr. Rodriguez today in the office and a complex cystic mass was seen in the left adnexa on US and was recommended to come in for further workup.     She was previously seen by other Gyn and was found to have + beta hcg that was downtrending. Pt reports no vaginal bleeding at this time, but just some spotting with wiping.       Gyn hx: menarche at 13 yo, history of monthly periods denies any STI, denies hx of abnormal pap smear   Last pap: may 2018 - NILM 27 yo  here for left lower quadrant on-and-off pain that has been going on for about 1 month. Reports that pain got worse tonight. She was seen by Dr. Rodriguez today in the office and a complex cystic mass was seen in the left adnexa on US and was recommended to come in for further workup.   From note from office today:  19 US revealed fibroid uterus, complex cystic mass seen anterior left = 97 x 65 x 81mm - minimal blood flow seen within surrounding tissue. Cystic structure seen lateral to mass = 69 x 26 x 58mm.  She was previously seen by other Gyn and was found to have + beta hcg that was downtrending. Pt reports no vaginal bleeding at this time, but just some spotting with wiping.       Gyn hx: menarche at 13 yo, history of monthly periods denies any STI, denies hx of abnormal pap smear   Last pap: may 2018 - NILM

## 2019-06-20 NOTE — ED ADULT NURSE REASSESSMENT NOTE - GENERAL PATIENT STATE
cooperative/comfortable appearance/family/SO at bedside
comfortable appearance/cooperative/family/SO at bedside

## 2019-06-20 NOTE — CONSULT NOTE ADULT - ASSESSMENT
27 yo with known ovarian mass and increasing LLQ pain with concern for torsion vs. adnexal mass.     Ovarian mass vs torsion  For OR in AM    h/o Asthma  Peak flow assessment STAT  CXR- ordered  clinically stable. no acute exacerbation    Clinically her comorbidities are stable. No clear contraindication to undergo planned procedure. Can use Duoneb treatments post operatively with incentive spirometry. Will review CXR and Peak flow when done.     will singout to MEdical consult service Dr Eubanks in AM

## 2019-06-20 NOTE — ED PROVIDER NOTE - GENITOURINARY, MLM
.Chaperone - Scribe Dyan No CMT, no vaginal discharge/bleeding, mild adnexal tenderness, no right adnexal tenderness. Chaperone - Scribe Dyan

## 2019-06-20 NOTE — PROGRESS NOTE ADULT - ASSESSMENT
27 yo with known ovarian mass and increasing LLQ pain with concern for torsion vs. adnexal mass.     Ovarian mass vs torsion  -MRI pelvis with/without contrast for further evaluation URGENT  - Tumor markers including , CEA, CA 19-9, Inhibin A/B, LDH, AFP and DHEAS ordered  - NPO  - IV pain meds    Hx of + beta hCG  - quantitative hCG and UCG negative    Prophylaxis:   - SCD for now. Will switch to lovenox vs heparin if no plan for OR.     Headache:  Tylenol 650mg x 1 now.     Above plan discussed with Dr. Rodriguez, will await further imaging and lab work to determine treatment plan.

## 2019-06-21 ENCOUNTER — RESULT REVIEW (OUTPATIENT)
Age: 29
End: 2019-06-21

## 2019-06-21 ENCOUNTER — APPOINTMENT (OUTPATIENT)
Dept: OBGYN | Facility: CLINIC | Age: 29
End: 2019-06-21

## 2019-06-21 LAB
ABO RH CONFIRMATION: SIGNIFICANT CHANGE UP
ANION GAP SERPL CALC-SCNC: 10 MMOL/L — SIGNIFICANT CHANGE UP (ref 5–17)
APTT BLD: 35.6 SEC — SIGNIFICANT CHANGE UP (ref 27.5–36.3)
BASOPHILS # BLD AUTO: 0 K/UL — SIGNIFICANT CHANGE UP (ref 0–0.2)
BASOPHILS NFR BLD AUTO: 0.2 % — SIGNIFICANT CHANGE UP (ref 0–2)
BUN SERPL-MCNC: 8 MG/DL — SIGNIFICANT CHANGE UP (ref 8–20)
CALCIUM SERPL-MCNC: 8.6 MG/DL — SIGNIFICANT CHANGE UP (ref 8.6–10.2)
CHLORIDE SERPL-SCNC: 103 MMOL/L — SIGNIFICANT CHANGE UP (ref 98–107)
CO2 SERPL-SCNC: 25 MMOL/L — SIGNIFICANT CHANGE UP (ref 22–29)
CREAT SERPL-MCNC: 0.47 MG/DL — LOW (ref 0.5–1.3)
EOSINOPHIL # BLD AUTO: 0.3 K/UL — SIGNIFICANT CHANGE UP (ref 0–0.5)
EOSINOPHIL NFR BLD AUTO: 3.1 % — SIGNIFICANT CHANGE UP (ref 0–6)
GLUCOSE BLDC GLUCOMTR-MCNC: 101 MG/DL — HIGH (ref 70–99)
GLUCOSE BLDC GLUCOMTR-MCNC: 120 MG/DL — HIGH (ref 70–99)
GLUCOSE BLDC GLUCOMTR-MCNC: 126 MG/DL — HIGH (ref 70–99)
GLUCOSE BLDC GLUCOMTR-MCNC: 93 MG/DL — SIGNIFICANT CHANGE UP (ref 70–99)
GLUCOSE BLDC GLUCOMTR-MCNC: 95 MG/DL — SIGNIFICANT CHANGE UP (ref 70–99)
GLUCOSE SERPL-MCNC: 100 MG/DL — SIGNIFICANT CHANGE UP (ref 70–115)
HCT VFR BLD CALC: 34.1 % — LOW (ref 37–47)
HGB BLD-MCNC: 11 G/DL — LOW (ref 12–16)
INR BLD: 1.08 RATIO — SIGNIFICANT CHANGE UP (ref 0.88–1.16)
LYMPHOCYTES # BLD AUTO: 2.2 K/UL — SIGNIFICANT CHANGE UP (ref 1–4.8)
LYMPHOCYTES # BLD AUTO: 23.3 % — SIGNIFICANT CHANGE UP (ref 20–55)
MCHC RBC-ENTMCNC: 29.5 PG — SIGNIFICANT CHANGE UP (ref 27–31)
MCHC RBC-ENTMCNC: 32.3 G/DL — SIGNIFICANT CHANGE UP (ref 32–36)
MCV RBC AUTO: 91.4 FL — SIGNIFICANT CHANGE UP (ref 81–99)
MONOCYTES # BLD AUTO: 0.6 K/UL — SIGNIFICANT CHANGE UP (ref 0–0.8)
MONOCYTES NFR BLD AUTO: 6.4 % — SIGNIFICANT CHANGE UP (ref 3–10)
NEUTROPHILS # BLD AUTO: 6.4 K/UL — SIGNIFICANT CHANGE UP (ref 1.8–8)
NEUTROPHILS NFR BLD AUTO: 66.8 % — SIGNIFICANT CHANGE UP (ref 37–73)
PLATELET # BLD AUTO: 362 K/UL — SIGNIFICANT CHANGE UP (ref 150–400)
POTASSIUM SERPL-MCNC: 4 MMOL/L — SIGNIFICANT CHANGE UP (ref 3.5–5.3)
POTASSIUM SERPL-SCNC: 4 MMOL/L — SIGNIFICANT CHANGE UP (ref 3.5–5.3)
PROTHROM AB SERPL-ACNC: 12.5 SEC — SIGNIFICANT CHANGE UP (ref 10–12.9)
RBC # BLD: 3.73 M/UL — LOW (ref 4.4–5.2)
RBC # FLD: 13.1 % — SIGNIFICANT CHANGE UP (ref 11–15.6)
SODIUM SERPL-SCNC: 138 MMOL/L — SIGNIFICANT CHANGE UP (ref 135–145)
WBC # BLD: 9.6 K/UL — SIGNIFICANT CHANGE UP (ref 4.8–10.8)
WBC # FLD AUTO: 9.6 K/UL — SIGNIFICANT CHANGE UP (ref 4.8–10.8)

## 2019-06-21 PROCEDURE — 99232 SBSQ HOSP IP/OBS MODERATE 35: CPT

## 2019-06-21 PROCEDURE — 71045 X-RAY EXAM CHEST 1 VIEW: CPT | Mod: 26

## 2019-06-21 PROCEDURE — 49205: CPT

## 2019-06-21 PROCEDURE — 88305 TISSUE EXAM BY PATHOLOGIST: CPT | Mod: 26

## 2019-06-21 PROCEDURE — 58140 MYOMECTOMY ABDOM METHOD: CPT | Mod: 59

## 2019-06-21 RX ORDER — FENTANYL CITRATE 50 UG/ML
50 INJECTION INTRAVENOUS
Refills: 0 | Status: DISCONTINUED | OUTPATIENT
Start: 2019-06-21 | End: 2019-06-21

## 2019-06-21 RX ORDER — ONDANSETRON 8 MG/1
4 TABLET, FILM COATED ORAL ONCE
Refills: 0 | Status: COMPLETED | OUTPATIENT
Start: 2019-06-21 | End: 2019-06-21

## 2019-06-21 RX ORDER — HYDROMORPHONE HYDROCHLORIDE 2 MG/ML
30 INJECTION INTRAMUSCULAR; INTRAVENOUS; SUBCUTANEOUS
Refills: 0 | Status: DISCONTINUED | OUTPATIENT
Start: 2019-06-21 | End: 2019-06-22

## 2019-06-21 RX ORDER — HYDROMORPHONE HYDROCHLORIDE 2 MG/ML
0.5 INJECTION INTRAMUSCULAR; INTRAVENOUS; SUBCUTANEOUS
Refills: 0 | Status: DISCONTINUED | OUTPATIENT
Start: 2019-06-21 | End: 2019-06-21

## 2019-06-21 RX ORDER — ALBUTEROL 90 UG/1
2 AEROSOL, METERED ORAL EVERY 6 HOURS
Refills: 0 | Status: DISCONTINUED | OUTPATIENT
Start: 2019-06-21 | End: 2019-06-24

## 2019-06-21 RX ORDER — SODIUM CHLORIDE 9 MG/ML
1000 INJECTION, SOLUTION INTRAVENOUS
Refills: 0 | Status: DISCONTINUED | OUTPATIENT
Start: 2019-06-21 | End: 2019-06-21

## 2019-06-21 RX ORDER — KETOROLAC TROMETHAMINE 30 MG/ML
30 SYRINGE (ML) INJECTION EVERY 6 HOURS
Refills: 0 | Status: DISCONTINUED | OUTPATIENT
Start: 2019-06-21 | End: 2019-06-22

## 2019-06-21 RX ADMIN — ONDANSETRON 4 MILLIGRAM(S): 8 TABLET, FILM COATED ORAL at 14:39

## 2019-06-21 RX ADMIN — Medication 1 BOTTLE: at 01:07

## 2019-06-21 RX ADMIN — Medication 30 MILLIGRAM(S): at 23:16

## 2019-06-21 RX ADMIN — Medication 30 MILLIGRAM(S): at 18:37

## 2019-06-21 RX ADMIN — HYDROMORPHONE HYDROCHLORIDE 30 MILLILITER(S): 2 INJECTION INTRAMUSCULAR; INTRAVENOUS; SUBCUTANEOUS at 14:45

## 2019-06-21 RX ADMIN — Medication 30 MILLIGRAM(S): at 17:28

## 2019-06-21 RX ADMIN — HYDROMORPHONE HYDROCHLORIDE 0.5 MILLIGRAM(S): 2 INJECTION INTRAMUSCULAR; INTRAVENOUS; SUBCUTANEOUS at 14:39

## 2019-06-21 RX ADMIN — HYDROMORPHONE HYDROCHLORIDE 30 MILLILITER(S): 2 INJECTION INTRAMUSCULAR; INTRAVENOUS; SUBCUTANEOUS at 16:31

## 2019-06-21 RX ADMIN — HYDROMORPHONE HYDROCHLORIDE 0.5 MILLIGRAM(S): 2 INJECTION INTRAMUSCULAR; INTRAVENOUS; SUBCUTANEOUS at 14:45

## 2019-06-21 RX ADMIN — SODIUM CHLORIDE 125 MILLILITER(S): 9 INJECTION, SOLUTION INTRAVENOUS at 23:15

## 2019-06-21 RX ADMIN — Medication 30 MILLIGRAM(S): at 23:15

## 2019-06-21 RX ADMIN — HYDROMORPHONE HYDROCHLORIDE 30 MILLILITER(S): 2 INJECTION INTRAMUSCULAR; INTRAVENOUS; SUBCUTANEOUS at 19:05

## 2019-06-21 RX ADMIN — ONDANSETRON 4 MILLIGRAM(S): 8 TABLET, FILM COATED ORAL at 23:15

## 2019-06-21 NOTE — PROGRESS NOTE ADULT - SUBJECTIVE AND OBJECTIVE BOX
GYNECOLOGIC ONCOLOGY PROGRESS NOTE    HD 2    PROBLEMS:  Ovarian mass, left      Pt seen and examined at bedside.     SUBJECTIVE:    Patient states she still has LLQ pain, but better than when admitted.   Flatus:+  Denies Nausea, Vomiting or Diarrhea.  Denies shortness of breath, chest pain or dyspnea on exertion.  NPO after midnight for surgery today    OBJECTIVE:     VITALS:  T(F): 98.3 (19 @ 08:38), Max: 98.7 (19 @ 19:28)  HR: 71 (19 @ 08:38) (61 - 74)  BP: 106/80 (19 @ 08:38) (104/66 - 130/82)  RR: 20 (19 @ 08:38) (18 - 20)  SpO2: 98% (19 @ 00:39) (98% - 100%)  Wt(kg): --    I&O's Summary    2019 07:01  -  2019 07:00  --------------------------------------------------------  IN: 1500 mL / OUT: 0 mL / NET: 1500 mL          Physical Exam:  Constitutional: NAD  Pulmonary: clear to auscultation bilaterally   Cardiovascular: Regular rate and rhythm   Abdomen: soft, slightly tender in LLQ, non-distended, normal bowel sounds  Extremities: no lower extremity edema or calve tenderness, Porter's sign negative.        LABS:                        11.0   9.6   )-----------( 362      ( 2019 08:18 )             34.1         138  |  103  |  8.0  ----------------------------<  100  4.0   |  25.0  |  0.47<L>    Ca    8.6      2019 08:18    TPro  8.0  /  Alb  4.4  /  TBili  0.3<L>  /  DBili  x   /  AST  14  /  ALT  15  /  AlkPhos  67  06-20    PT/INR - ( 2019 08:18 )   PT: 12.5 sec;   INR: 1.08 ratio         PTT - ( 2019 08:18 )  PTT:35.6 sec  Urinalysis Basic - ( 2019 03:22 )    Color: Yellow / Appearance: Clear / S.005 / pH: x  Gluc: x / Ketone: Negative  / Bili: Negative / Urobili: Negative mg/dL   Blood: x / Protein: Negative mg/dL / Nitrite: Negative   Leuk Esterase: Negative / RBC: 0-2 /HPF / WBC Negative   Sq Epi: x / Non Sq Epi: Occasional / Bacteria: x          dextrose 5% + sodium chloride 0.45%. 1000 milliLiter(s) IV Continuous <Continuous>  ketorolac   Injectable 15 milliGRAM(s) IV Push every 6 hours PRN  lactated ringers. 1000 milliLiter(s) IV Continuous <Continuous>  morphine  - Injectable 2 milliGRAM(s) IV Push every 4 hours PRN GYNECOLOGIC ONCOLOGY PROGRESS NOTE    HD 2    PROBLEMS:  Ovarian mass, left      Pt seen and examined at bedside.     SUBJECTIVE:    Patient states she still has LLQ pain, but better than when admitted.   Flatus:+  Denies Nausea, Vomiting or Diarrhea.  Denies shortness of breath, chest pain or dyspnea on exertion.  NPO after midnight for surgery today    OBJECTIVE:     VITALS:  T(F): 98.3 (19 @ 08:38), Max: 98.7 (19 @ 19:28)  HR: 71 (19 @ 08:38) (61 - 74)  BP: 106/80 (19 @ 08:38) (104/66 - 130/82)  RR: 20 (19 @ 08:38) (18 - 20)  SpO2: 98% (19 @ 00:39) (98% - 100%)  Wt(kg): --    I&O's Summary    2019 07:01  -  2019 07:00  --------------------------------------------------------  IN: 1500 mL / OUT: 0 mL / NET: 1500 mL          Physical Exam:  Constitutional: NAD  Pulmonary: clear to auscultation bilaterally   Cardiovascular: Regular rate and rhythm   Abdomen: soft, slightly tender in LLQ, non-distended, normal bowel sounds  Extremities: no lower extremity edema or calve tenderness, Porter's sign negative.        LABS:                        11.0   9.6   )-----------( 362      ( 2019 08:18 )             34.1         138  |  103  |  8.0  ----------------------------<  100  4.0   |  25.0  |  0.47<L>    Ca    8.6      2019 08:18    TPro  8.0  /  Alb  4.4  /  TBili  0.3<L>  /  DBili  x   /  AST  14  /  ALT  15  /  AlkPhos  67  06-20    PT/INR - ( 2019 08:18 )   PT: 12.5 sec;   INR: 1.08 ratio         PTT - ( 2019 08:18 )  PTT:35.6 sec  Urinalysis Basic - ( 2019 03:22 )    Color: Yellow / Appearance: Clear / S.005 / pH: x  Gluc: x / Ketone: Negative  / Bili: Negative / Urobili: Negative mg/dL   Blood: x / Protein: Negative mg/dL / Nitrite: Negative   Leuk Esterase: Negative / RBC: 0-2 /HPF / WBC Negative   Sq Epi: x / Non Sq Epi: Occasional / Bacteria: x    Carcinoembryonic Antigen: <0.6: Test Repeated   CEA Normal Ranges   _________________   Non-smoker: less than 3.9 ng/mL       Smoker: less than 5.5 ng/mL ng/mL (19 @ 18:42)    Cancer Antigen, 125 (19 @ 18:42)    Cancer Antigen, 125: 38 U/mL    Dehydroepiandrosterone-Sulfate, Serum (19 @ 18:42)    Dehydroepiandrosterone-Sulfate, Serum: 146.0: Male                            Female  Mk Stage I   7-209 ug/dL   7-126 ug/dL  Mk Stage II   ug/dL  ug/dL  Mk Stage III   ug/dL  ug/dL  Mk Stage IV&V                      ug/dL  ug/dL ug/dL                        dextrose 5% + sodium chloride 0.45%. 1000 milliLiter(s) IV Continuous <Continuous>  ketorolac   Injectable 15 milliGRAM(s) IV Push every 6 hours PRN  lactated ringers. 1000 milliLiter(s) IV Continuous <Continuous>  morphine  - Injectable 2 milliGRAM(s) IV Push every 4 hours PRN

## 2019-06-21 NOTE — PROGRESS NOTE ADULT - SUBJECTIVE AND OBJECTIVE BOX
Patient seen and examined . Kosovan speaking only ,  at bed side ( Mr Maryam Montes ) helped with translation . C/O mild LLQ pain , no n/v , no sob , no cp , no other complaints .     CC : LLQ pain / large L adnexal mass       PAST MEDICAL & SURGICAL HISTORY:  Enlarged ovary  No significant past surgical history      MEDICATIONS  (STANDING):  dextrose 5% + sodium chloride 0.45%. 1000 milliLiter(s) (125 mL/Hr) IV Continuous <Continuous>  lactated ringers. 1000 milliLiter(s) (125 mL/Hr) IV Continuous <Continuous>    MEDICATIONS  (PRN):  ketorolac   Injectable 15 milliGRAM(s) IV Push every 6 hours PRN Moderate Pain (4 - 6)  morphine  - Injectable 2 milliGRAM(s) IV Push every 4 hours PRN Severe Pain (7 - 10)      LABS:                          12.2   10.0  )-----------( 400      ( 2019 12:19 )             37.8     06-20    138  |  102  |  8.0  ----------------------------<  77  4.2   |  25.0  |  0.44<L>    Ca    9.6      2019 12:19    TPro  8.0  /  Alb  4.4  /  TBili  0.3<L>  /  DBili  x   /  AST  14  /  ALT  15  /  AlkPhos  67  06-20    PT/INR - ( 2019 03:17 )   PT: 12.3 sec;   INR: 1.07 ratio         PTT - ( 2019 03:17 )  PTT:36.9 sec  Urinalysis Basic - ( 2019 03:22 )    Color: Yellow / Appearance: Clear / S.005 / pH: x  Gluc: x / Ketone: Negative  / Bili: Negative / Urobili: Negative mg/dL   Blood: x / Protein: Negative mg/dL / Nitrite: Negative   Leuk Esterase: Negative / RBC: 0-2 /HPF / WBC Negative   Sq Epi: x / Non Sq Epi: Occasional / Bacteria: x  Pregnancy Profile, Urine (19 @ 03:22)      Pregnancy Profile, Urine: Negative: There is potential for a false-negative result for very early pregnancies  or with gestational age 5-7 weeks or above. The quantitative measurement  of serum hCG provides the most sensitive and accurate assessment of  pregnancy status.      RADIOLOGY & ADDITIONAL TESTS:  < from: MR Pelvis w/wo IV Cont (19 @ 15:00) >   EXAM:  MR PELVIS WAW IC                          PROCEDURE DATE:  2019          INTERPRETATION:  CLINICAL INFORMATION: Left adnexal mass on ultrasound.    COMPARISON: Pelvic ultrasound of the same day    PROCEDURE:   MRI of the pelvis was performed with and without intravenous contrast.  IV Contrast: Gadavist. 8 cc administered, 2 cc discarded.    FINDINGS:    UTERUS: Measures 7.7 x 4.7 x 4.8 cm Right anterior uterine body fibroid   measuring 3.8 x 3.2 x 3.4 cm.  ENDOMETRIUM: No wall thickness measuring 7 mm. A small amount of   hemorrhage is seen within the endometrial canal (series 1100 image 29).  JUNCTIONAL ZONE: Within normal limits for thickness without evidence of   adenomyosis.    RIGHT OVARY: Size measuring 2.8 x 1.7 x 2.8 cm.  LEFT OVARY: There is a lobulated multi septated cystic mass arising from   the left ovary measuring 15.1 x 9.7 x 7.6 cm (transverse x craniocaudad x   anteroposterior). The peripheral aspect of the mass demonstrates high T2   signal and the more central aspect of the mass demonstrates heterogeneous   T2 signal.    BLADDER: Within normal limits.    LYMPH NODES: No pelvic lymphadenopathy.    VISUALIZED PORTIONS:    ABDOMINAL ORGANS: Within normal limits.  BOWEL: Within normal limits.  PERITONEUM: Small amount of free fluid in the pelvis.  VESSELS: Within normal limits.  ABDOMINAL WALL: Within normal limits.  BONES: No aggressive osseous lesion.    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    Given the history of intermittent left lower quadrant pain, intermittent   torsion cannot be excluded; clinical correlation is recommended.    Small amount of pelvic ascites.    The findings were discussed with SARA Coronado at 4:18 PM on 2019.          CINDY MOISE   This document has been electronically signed. 2019  4:20PM    < end of copied text >    < from: US Pelvis Complete (19 @ 05:41) >     EXAM:  US TRANSVAGINAL                         EXAM:  US PELVIC COMPLETE                          PROCEDURE DATE:  2019          INTERPRETATION:  CLINICAL INFORMATION: Left-sided pelvic pain for one   week. History of ovarian mass on outsideultrasound.    LMP: 2019    COMPARISON: None available.    TECHNIQUE:     Endovaginal and transabdominal pelvic sonogram. Color and Spectral   Doppler was performed.    FINDINGS:    Uterus: 8.0 x 4.7 x 4.5 cm. Within normal limits.    Endometrium: 7 mm. Within normal limits.    Right ovary: 3.1 x 2.2 x 2.8 cm. Within normal limits. Normal arterial   and venous waveforms.    Left ovary: Large heterogeneous left adnexal mass measuring 16.7 x 9.0 x   17.1 cm, likely arising from the left ovary.Arterial blood flow is seen.   The mass is predominantly avascular, likely hemorrhagic content. There is   an adjacent mass which appears to arising from the uterus measuring 4.3 x   3.5 x 4.1 cm, possibly a fibroid.    Fluid: None.    IMPRESSION:     Large left adnexal mass measuring 17.1 cm, likely arising from the left   ovary; ovarian torsion cannot be excluded on the basis of this study;   clinical correlation is recommended.    In the absence of planned intervention, a pre and post IV contrast MRI of   the pelvis is recommended to further characterize the left adnexal mass.     The findings were discussed with SARA Coronado at 9:07 AM on 2019.    CINDY MOISE   This document has been electronically signed. 2019  9:09AM        < end of copied text >          REVIEW OF SYSTEMS:    LLQ pain , all other systems reviewed and are negative     Vital Signs Last 24 Hrs  T(C): 36.4 (2019 00:39), Max: 37.1 (2019 19:28)  T(F): 97.6 (2019 00:39), Max: 98.7 (2019 19:28)  HR: 74 (2019 00:39) (61 - 74)  BP: 118/75 (2019 00:39) (104/66 - 130/82)  BP(mean): --  RR: 18 (2019 00:39) (18 - 18)  SpO2: 98% (2019 00:39) (98% - 100%)    PHYSICAL EXAM:    GENERAL: NAD, well-groomed, well-developed  HEAD:  Atraumatic, Normocephalic  EYES: EOMI, PERRLA, conjunctiva and sclera clear  NECK: Supple, No JVD, Normal thyroid  NERVOUS SYSTEM:  Alert & Oriented X3, no focal deficit  CHEST/LUNG: CTA b/l ,  no  rales, rhonchi, wheezing, or rubs  HEART: Regular rate and rhythm; No murmurs, rubs, or gallops  ABDOMEN: Soft, LLQ tenderness + ,Nondistended; Bowel sounds present  EXTREMITIES:  2+ Peripheral Pulses, No clubbing, cyanosis, or edema  LYMPH: No lymphadenopathy noted  SKIN: No rashes or lesions

## 2019-06-21 NOTE — PROGRESS NOTE ADULT - ASSESSMENT
28 yrs female with Hx of mild Asthma - stable , with LLQ pain , found to have large L adnexal mass , planned for OR today .       1. LLQ pain due to large L adnexal mass - planned for OR today , keep NPO , IVF , pain meds   2. Hx of asthma - last exacerbation  12/18 - stable , uses inheler PRN , last time used 12/18 - may continue      CXR done - reviewed by me - no acute pathology

## 2019-06-21 NOTE — CHART NOTE - NSCHARTNOTEFT_GEN_A_CORE
Pt seen at bedside feeling well. Denies much pain when not moving. Using PCA. Denies n/v. Has had some juice and water to drink. Reports feeling hungry     ICU Vital Signs Last 24 Hrs  T(C): 36.9 (21 Jun 2019 17:05), Max: 37.1 (20 Jun 2019 19:28)  T(F): 98.5 (21 Jun 2019 17:05), Max: 98.8 (21 Jun 2019 15:30)  HR: 72 (21 Jun 2019 17:05) (60 - 88)  BP: 112/71 (21 Jun 2019 17:05) (101/53 - 130/82)      Gen: Well appearing, slightly pale  Cardio: RRR  Pulm, CTABL, IS up to 1500  Abd: soft, slightly tender to deep palpation, non-distended, + BS    petersen draining clear urine with approximately 70 cc in bag currently    I&O's Summary    21 Jun 2019 07:01  -  21 Jun 2019 18:11  --------------------------------------------------------  IN: 1375 mL / OUT: 300 mL / NET: 1075 mL    Pt doing well s/p left salpingectomy and removal of mass/ clot. Making adequate UOP. Will follow up AM cbc.  - PCA for pain control  - regular diet  - IV fluids until petersen is out  - encourage IS   - SCD for VTE prophylaxis

## 2019-06-21 NOTE — PROGRESS NOTE ADULT - ASSESSMENT
29 yo with 15 cm ovarian mass and increasing LLQ pain with concern for torsion. Pan for OR today as add-on case. Pending time.     Ovarian mass vs torsion  - f/u Tumor markers including , CEA, CA 19-9, Inhibin A/B, LDH, AFP and DHEAS ordered  - NPO  - IV pain meds    Prophylaxis:   - SCD for now. Will switch to lovenox after OR 29 yo with 15 cm ovarian mass and increasing LLQ pain with concern for torsion. Pan for OR today as add-on case. Pending time.     Ovarian mass vs torsion  - tumor markers WNL  - NPO  - IV pain meds    Prophylaxis:   - SCD for now. Will switch to lovenox after OR

## 2019-06-22 LAB
ANION GAP SERPL CALC-SCNC: 12 MMOL/L — SIGNIFICANT CHANGE UP (ref 5–17)
BASOPHILS # BLD AUTO: 0 K/UL — SIGNIFICANT CHANGE UP (ref 0–0.2)
BASOPHILS NFR BLD AUTO: 0.1 % — SIGNIFICANT CHANGE UP (ref 0–2)
BUN SERPL-MCNC: 6 MG/DL — LOW (ref 8–20)
CALCIUM SERPL-MCNC: 8.6 MG/DL — SIGNIFICANT CHANGE UP (ref 8.6–10.2)
CHLORIDE SERPL-SCNC: 101 MMOL/L — SIGNIFICANT CHANGE UP (ref 98–107)
CO2 SERPL-SCNC: 24 MMOL/L — SIGNIFICANT CHANGE UP (ref 22–29)
CREAT SERPL-MCNC: 0.54 MG/DL — SIGNIFICANT CHANGE UP (ref 0.5–1.3)
EOSINOPHIL # BLD AUTO: 0 K/UL — SIGNIFICANT CHANGE UP (ref 0–0.5)
EOSINOPHIL NFR BLD AUTO: 0.1 % — SIGNIFICANT CHANGE UP (ref 0–6)
GLUCOSE SERPL-MCNC: 104 MG/DL — SIGNIFICANT CHANGE UP (ref 70–115)
HCT VFR BLD CALC: 33.6 % — LOW (ref 37–47)
HCT VFR BLD CALC: 35.3 % — LOW (ref 37–47)
HGB BLD-MCNC: 10.6 G/DL — LOW (ref 12–16)
HGB BLD-MCNC: 11.4 G/DL — LOW (ref 12–16)
LYMPHOCYTES # BLD AUTO: 1.8 K/UL — SIGNIFICANT CHANGE UP (ref 1–4.8)
LYMPHOCYTES # BLD AUTO: 10.6 % — LOW (ref 20–55)
MCHC RBC-ENTMCNC: 28.9 PG — SIGNIFICANT CHANGE UP (ref 27–31)
MCHC RBC-ENTMCNC: 29.4 PG — SIGNIFICANT CHANGE UP (ref 27–31)
MCHC RBC-ENTMCNC: 31.5 G/DL — LOW (ref 32–36)
MCHC RBC-ENTMCNC: 32.3 G/DL — SIGNIFICANT CHANGE UP (ref 32–36)
MCV RBC AUTO: 91 FL — SIGNIFICANT CHANGE UP (ref 81–99)
MCV RBC AUTO: 91.6 FL — SIGNIFICANT CHANGE UP (ref 81–99)
MONOCYTES # BLD AUTO: 0.8 K/UL — SIGNIFICANT CHANGE UP (ref 0–0.8)
MONOCYTES NFR BLD AUTO: 4.9 % — SIGNIFICANT CHANGE UP (ref 3–10)
NEUTROPHILS # BLD AUTO: 14.1 K/UL — HIGH (ref 1.8–8)
NEUTROPHILS NFR BLD AUTO: 83.9 % — HIGH (ref 37–73)
PLATELET # BLD AUTO: 362 K/UL — SIGNIFICANT CHANGE UP (ref 150–400)
PLATELET # BLD AUTO: 380 K/UL — SIGNIFICANT CHANGE UP (ref 150–400)
POTASSIUM SERPL-MCNC: 4 MMOL/L — SIGNIFICANT CHANGE UP (ref 3.5–5.3)
POTASSIUM SERPL-SCNC: 4 MMOL/L — SIGNIFICANT CHANGE UP (ref 3.5–5.3)
RBC # BLD: 3.67 M/UL — LOW (ref 4.4–5.2)
RBC # BLD: 3.88 M/UL — LOW (ref 4.4–5.2)
RBC # FLD: 12.9 % — SIGNIFICANT CHANGE UP (ref 11–15.6)
RBC # FLD: 13 % — SIGNIFICANT CHANGE UP (ref 11–15.6)
SODIUM SERPL-SCNC: 137 MMOL/L — SIGNIFICANT CHANGE UP (ref 135–145)
WBC # BLD: 14.3 K/UL — HIGH (ref 4.8–10.8)
WBC # BLD: 16.8 K/UL — HIGH (ref 4.8–10.8)
WBC # FLD AUTO: 14.3 K/UL — HIGH (ref 4.8–10.8)
WBC # FLD AUTO: 16.8 K/UL — HIGH (ref 4.8–10.8)

## 2019-06-22 PROCEDURE — 99233 SBSQ HOSP IP/OBS HIGH 50: CPT

## 2019-06-22 RX ORDER — ONDANSETRON 8 MG/1
4 TABLET, FILM COATED ORAL ONCE
Refills: 0 | Status: COMPLETED | OUTPATIENT
Start: 2019-06-22 | End: 2019-06-22

## 2019-06-22 RX ORDER — DOCUSATE SODIUM 100 MG
100 CAPSULE ORAL
Refills: 0 | Status: DISCONTINUED | OUTPATIENT
Start: 2019-06-22 | End: 2019-06-24

## 2019-06-22 RX ORDER — OXYCODONE AND ACETAMINOPHEN 5; 325 MG/1; MG/1
1 TABLET ORAL EVERY 4 HOURS
Refills: 0 | Status: DISCONTINUED | OUTPATIENT
Start: 2019-06-22 | End: 2019-06-24

## 2019-06-22 RX ORDER — SODIUM CHLORIDE 9 MG/ML
500 INJECTION, SOLUTION INTRAVENOUS ONCE
Refills: 0 | Status: COMPLETED | OUTPATIENT
Start: 2019-06-22 | End: 2019-06-22

## 2019-06-22 RX ORDER — OXYCODONE AND ACETAMINOPHEN 5; 325 MG/1; MG/1
2 TABLET ORAL EVERY 4 HOURS
Refills: 0 | Status: DISCONTINUED | OUTPATIENT
Start: 2019-06-22 | End: 2019-06-24

## 2019-06-22 RX ORDER — SODIUM CHLORIDE 9 MG/ML
1000 INJECTION INTRAMUSCULAR; INTRAVENOUS; SUBCUTANEOUS ONCE
Refills: 0 | Status: COMPLETED | OUTPATIENT
Start: 2019-06-22 | End: 2019-06-22

## 2019-06-22 RX ADMIN — Medication 15 MILLIGRAM(S): at 17:23

## 2019-06-22 RX ADMIN — ONDANSETRON 4 MILLIGRAM(S): 8 TABLET, FILM COATED ORAL at 10:21

## 2019-06-22 RX ADMIN — HYDROMORPHONE HYDROCHLORIDE 30 MILLILITER(S): 2 INJECTION INTRAMUSCULAR; INTRAVENOUS; SUBCUTANEOUS at 07:12

## 2019-06-22 RX ADMIN — OXYCODONE AND ACETAMINOPHEN 2 TABLET(S): 5; 325 TABLET ORAL at 21:49

## 2019-06-22 RX ADMIN — Medication 30 MILLIGRAM(S): at 11:56

## 2019-06-22 RX ADMIN — Medication 100 MILLIGRAM(S): at 17:25

## 2019-06-22 RX ADMIN — SODIUM CHLORIDE 1000 MILLILITER(S): 9 INJECTION INTRAMUSCULAR; INTRAVENOUS; SUBCUTANEOUS at 11:32

## 2019-06-22 RX ADMIN — SODIUM CHLORIDE 125 MILLILITER(S): 9 INJECTION, SOLUTION INTRAVENOUS at 05:53

## 2019-06-22 RX ADMIN — OXYCODONE AND ACETAMINOPHEN 2 TABLET(S): 5; 325 TABLET ORAL at 22:40

## 2019-06-22 RX ADMIN — Medication 30 MILLIGRAM(S): at 05:53

## 2019-06-22 RX ADMIN — Medication 30 MILLIGRAM(S): at 11:41

## 2019-06-22 RX ADMIN — SODIUM CHLORIDE 1000 MILLILITER(S): 9 INJECTION, SOLUTION INTRAVENOUS at 10:22

## 2019-06-22 RX ADMIN — Medication 30 MILLIGRAM(S): at 05:54

## 2019-06-22 RX ADMIN — Medication 15 MILLIGRAM(S): at 17:38

## 2019-06-22 NOTE — PROGRESS NOTE ADULT - ASSESSMENT
29 yo s/p ex lap and left salpingectomy for likely old ruptured ectopic pregnancy and myomectomy. Pt doing well post-operatively. Still pending void this morning after petersen d/josé luis. CBC stable.  - switch to PO pain meds and d/c PCA  - f/u void  - SCD for DVT prophylaxis   - encourage ambulation  - encourage ISS    Asthma  - continue albuterol PRN  - asymptomatic

## 2019-06-22 NOTE — PROGRESS NOTE ADULT - ASSESSMENT
28 yrs female with Hx of mild Asthma found to have large L adnexal mass. She is s/p Exploratory laparotomy With lt salpingectomy.     # Lt adnexal mass s/p Lt salpingectomy: pain control,   PT, Bowel regimen , IS, DVT px per Gyn team    # post procedural hypotension - most likely 2/2 narcotics - IV fluids bolus , monitor   # Asthma - mild - not in flare - supportive care   # leucocytosis - reactive   Will follow

## 2019-06-22 NOTE — PROGRESS NOTE ADULT - SUBJECTIVE AND OBJECTIVE BOX
LORIN KRAFT    [id number]    28y      Female      Lt adnexal mass s/p Exp lap POD#1    CC: feels dizzy, pain is well controlled   feels nauseous but tolerated breakfast, urinated after petersen was discontinued.  No other complaints    INTERVAL HPI/OVERNIGHT EVENTS: no acute events     REVIEW OF SYSTEMS:    CONSTITUTIONAL: No fever or fatigue  RESPIRATORY: No cough, wheezing, No shortness of breath  CARDIOVASCULAR: No chest pain, palpitations  GASTROINTESTINAL: No abdominal or epigastric pain. No vomiting        Vital Signs Last 24 Hrs  T(C): 36.7 (22 Jun 2019 10:04), Max: 37.1 (21 Jun 2019 15:30)  T(F): 98.1 (22 Jun 2019 10:04), Max: 98.8 (21 Jun 2019 15:30)  HR: 75 (22 Jun 2019 11:24) (60 - 90)  BP: 94/62 (22 Jun 2019 11:24) (94/62 - 115/66)  BP(mean): --  RR: 18 (22 Jun 2019 11:24) (12 - 24)  SpO2: 98% (22 Jun 2019 11:24) (96% - 100%)    PHYSICAL EXAM:    GENERAL: NAD, well-groomed  HEENT: PERRL, +EOMI  NECK: soft, Supple   CHEST/LUNG: Clear to percussion bilaterally; No wheezing  HEART: S1S2+, Regular rate and rhythm; No murmurs  ABDOMEN: Soft, Nontender, Nondistended; Bowel sounds present  EXTREMITIES:  No clubbing, cyanosis, or edema  SKIN: No rashes or lesions  NEURO: AAOX3      06-21 @ 07:01 - 06-22 @ 07:00  --------------------------------------------------------  IN: 2875 mL / OUT: 2050 mL / NET: 825 mL    06-22 @ 07:01 - 06-22 @ 12:25  --------------------------------------------------------  IN: 750 mL / OUT: 300 mL / NET: 450 mL        LABS:                        11.4   16.8  )-----------( 380      ( 22 Jun 2019 07:00 )             35.3     06-22    137  |  101  |  6.0<L>  ----------------------------<  104  4.0   |  24.0  |  0.54    Ca    8.6      22 Jun 2019 07:00      PT/INR - ( 21 Jun 2019 08:18 )   PT: 12.5 sec;   INR: 1.08 ratio         PTT - ( 21 Jun 2019 08:18 )  PTT:35.6 sec        MEDICATIONS  (STANDING):  docusate sodium 100 milliGRAM(s) Oral two times a day  lactated ringers. 1000 milliLiter(s) (125 mL/Hr) IV Continuous <Continuous>    MEDICATIONS  (PRN):  ALBUTerol    90 MICROgram(s) HFA Inhaler 2 Puff(s) Inhalation every 6 hours PRN Shortness of Breath and/or Wheezing  ketorolac   Injectable 15 milliGRAM(s) IV Push every 6 hours PRN Moderate Pain (4 - 6)  morphine  - Injectable 2 milliGRAM(s) IV Push every 4 hours PRN Severe Pain (7 - 10)  oxyCODONE    5 mG/acetaminophen 325 mG 1 Tablet(s) Oral every 4 hours PRN Moderate Pain (4 - 6)  oxyCODONE    5 mG/acetaminophen 325 mG 2 Tablet(s) Oral every 4 hours PRN Severe Pain (7 - 10)      RADIOLOGY & ADDITIONAL TESTS:

## 2019-06-22 NOTE — CHART NOTE - NSCHARTNOTEFT_GEN_A_CORE
Called by nurse because pt was nauseous after eating breakfast and dizzy when getting up.    ICU Vital Signs Last 24 Hrs  T(C): 36.7 (22 Jun 2019 10:04), Max: 37.1 (21 Jun 2019 15:30)  T(F): 98.1 (22 Jun 2019 10:04), Max: 98.8 (21 Jun 2019 15:30)  HR: 75 (22 Jun 2019 11:24) (60 - 90)  BP: 94/62 (22 Jun 2019 11:24) (94/62 - 115/66)  RR: 18 (22 Jun 2019 11:24) (12 - 24)  SpO2: 98% (22 Jun 2019 11:24) (96% - 100%)    Gen: pale appearing laying flat in bed   Abd: soft, non-tender, non-distended, + BS      She got 500 ml bolus of LR. Getting 1 L bolus of NS per medicine. will repeat CBC stat.     d/w Dr. Rodriguez

## 2019-06-22 NOTE — PROGRESS NOTE ADULT - SUBJECTIVE AND OBJECTIVE BOX
GYNECOLOGIC ONCOLOGY PROGRESS NOTE    POD#1    PROBLEMS:  Ovarian mass, left      Pt seen and examined at bedside.     SUBJECTIVE:    Patient is without complaints.  Pain well-controlled.  Flatus: -  Denies Nausea, Vomiting or Diarrhea.  Denies shortness of breath, chest pain or dyspnea on exertion.  Hasn't had full diet. Only ate jello yesterday.     OBJECTIVE:     VITALS:  T(F): 97.4 (06-22-19 @ 08:55), Max: 98.8 (06-21-19 @ 15:30)  HR: 66 (06-22-19 @ 08:55) (60 - 90)  BP: 98/65 (06-22-19 @ 08:55) (98/65 - 115/66)  RR: 18 (06-22-19 @ 08:55) (12 - 24)  SpO2: 100% (06-22-19 @ 08:55) (96% - 100%)  Wt(kg): --    I&O's Summary    21 Jun 2019 07:01  -  22 Jun 2019 07:00  --------------------------------------------------------  IN: 2875 mL / OUT: 2050 mL / NET: 825 mL          Physical Exam:  Constitutional: NAD  Pulmonary: clear to auscultation bilaterally   Cardiovascular: Regular rate and rhythm   Abdomen: soft, non-tender, non-distended, normal bowel sounds  Extremities: no lower extremity edema or calve tenderness, Porter's sign negative.  Incision: vertical incision clean and, intact with staples in place.  Slight serosanguinous discharge from inferior portion of incision. No erythema.      LABS:                        11.4   16.8  )-----------( 380      ( 22 Jun 2019 07:00 )             35.3     06-22    137  |  101  |  6.0<L>  ----------------------------<  104  4.0   |  24.0  |  0.54    Ca    8.6      22 Jun 2019 07:00      PT/INR - ( 21 Jun 2019 08:18 )   PT: 12.5 sec;   INR: 1.08 ratio         PTT - ( 21 Jun 2019 08:18 )  PTT:35.6 sec        ALBUTerol    90 MICROgram(s) HFA Inhaler 2 Puff(s) Inhalation every 6 hours PRN  docusate sodium 100 milliGRAM(s) Oral two times a day  ketorolac   Injectable 30 milliGRAM(s) IV Push every 6 hours  ketorolac   Injectable 15 milliGRAM(s) IV Push every 6 hours PRN  lactated ringers. 1000 milliLiter(s) IV Continuous <Continuous>  morphine  - Injectable 2 milliGRAM(s) IV Push every 4 hours PRN  oxyCODONE    5 mG/acetaminophen 325 mG 1 Tablet(s) Oral every 4 hours PRN  oxyCODONE    5 mG/acetaminophen 325 mG 2 Tablet(s) Oral every 4 hours PRN

## 2019-06-23 ENCOUNTER — TRANSCRIPTION ENCOUNTER (OUTPATIENT)
Age: 29
End: 2019-06-23

## 2019-06-23 LAB
ANION GAP SERPL CALC-SCNC: 10 MMOL/L — SIGNIFICANT CHANGE UP (ref 5–17)
BASOPHILS # BLD AUTO: 0 K/UL — SIGNIFICANT CHANGE UP (ref 0–0.2)
BASOPHILS NFR BLD AUTO: 0.1 % — SIGNIFICANT CHANGE UP (ref 0–2)
BUN SERPL-MCNC: 6 MG/DL — LOW (ref 8–20)
CALCIUM SERPL-MCNC: 8.2 MG/DL — LOW (ref 8.6–10.2)
CHLORIDE SERPL-SCNC: 105 MMOL/L — SIGNIFICANT CHANGE UP (ref 98–107)
CO2 SERPL-SCNC: 24 MMOL/L — SIGNIFICANT CHANGE UP (ref 22–29)
CREAT SERPL-MCNC: 0.56 MG/DL — SIGNIFICANT CHANGE UP (ref 0.5–1.3)
EOSINOPHIL # BLD AUTO: 0.3 K/UL — SIGNIFICANT CHANGE UP (ref 0–0.5)
EOSINOPHIL NFR BLD AUTO: 2 % — SIGNIFICANT CHANGE UP (ref 0–6)
GLUCOSE SERPL-MCNC: 78 MG/DL — SIGNIFICANT CHANGE UP (ref 70–115)
HCT VFR BLD CALC: 30.9 % — LOW (ref 37–47)
HGB BLD-MCNC: 9.8 G/DL — LOW (ref 12–16)
INHIBIN A SERPL-MCNC: 5 PG/ML — SIGNIFICANT CHANGE UP
INHIBIN B SERUM: 43 PG/ML — SIGNIFICANT CHANGE UP
LYMPHOCYTES # BLD AUTO: 19.1 % — LOW (ref 20–55)
LYMPHOCYTES # BLD AUTO: 2.8 K/UL — SIGNIFICANT CHANGE UP (ref 1–4.8)
MCHC RBC-ENTMCNC: 29.1 PG — SIGNIFICANT CHANGE UP (ref 27–31)
MCHC RBC-ENTMCNC: 31.7 G/DL — LOW (ref 32–36)
MCV RBC AUTO: 91.7 FL — SIGNIFICANT CHANGE UP (ref 81–99)
MONOCYTES # BLD AUTO: 1 K/UL — HIGH (ref 0–0.8)
MONOCYTES NFR BLD AUTO: 6.7 % — SIGNIFICANT CHANGE UP (ref 3–10)
NEUTROPHILS # BLD AUTO: 10.6 K/UL — HIGH (ref 1.8–8)
NEUTROPHILS NFR BLD AUTO: 71.8 % — SIGNIFICANT CHANGE UP (ref 37–73)
PLATELET # BLD AUTO: 323 K/UL — SIGNIFICANT CHANGE UP (ref 150–400)
POTASSIUM SERPL-MCNC: 4.1 MMOL/L — SIGNIFICANT CHANGE UP (ref 3.5–5.3)
POTASSIUM SERPL-SCNC: 4.1 MMOL/L — SIGNIFICANT CHANGE UP (ref 3.5–5.3)
RBC # BLD: 3.37 M/UL — LOW (ref 4.4–5.2)
RBC # FLD: 13.1 % — SIGNIFICANT CHANGE UP (ref 11–15.6)
SODIUM SERPL-SCNC: 139 MMOL/L — SIGNIFICANT CHANGE UP (ref 135–145)
WBC # BLD: 14.7 K/UL — HIGH (ref 4.8–10.8)
WBC # FLD AUTO: 14.7 K/UL — HIGH (ref 4.8–10.8)

## 2019-06-23 PROCEDURE — 99232 SBSQ HOSP IP/OBS MODERATE 35: CPT

## 2019-06-23 RX ORDER — DOCUSATE SODIUM 100 MG
1 CAPSULE ORAL
Qty: 12 | Refills: 0
Start: 2019-06-23 | End: 2019-06-26

## 2019-06-23 RX ADMIN — Medication 15 MILLIGRAM(S): at 05:23

## 2019-06-23 RX ADMIN — Medication 500 MILLIGRAM(S): at 18:30

## 2019-06-23 RX ADMIN — Medication 15 MILLIGRAM(S): at 05:45

## 2019-06-23 RX ADMIN — OXYCODONE AND ACETAMINOPHEN 2 TABLET(S): 5; 325 TABLET ORAL at 15:52

## 2019-06-23 RX ADMIN — Medication 500 MILLIGRAM(S): at 17:32

## 2019-06-23 RX ADMIN — OXYCODONE AND ACETAMINOPHEN 2 TABLET(S): 5; 325 TABLET ORAL at 16:50

## 2019-06-23 RX ADMIN — OXYCODONE AND ACETAMINOPHEN 1 TABLET(S): 5; 325 TABLET ORAL at 12:30

## 2019-06-23 RX ADMIN — Medication 100 MILLIGRAM(S): at 17:32

## 2019-06-23 RX ADMIN — OXYCODONE AND ACETAMINOPHEN 1 TABLET(S): 5; 325 TABLET ORAL at 11:37

## 2019-06-23 RX ADMIN — Medication 100 MILLIGRAM(S): at 05:21

## 2019-06-23 NOTE — DISCHARGE NOTE PROVIDER - NSDCACTIVITY_GEN_ALL_CORE
No heavy lifting/straining/Showering allowed/Do not make important decisions/Do not drive or operate machinery/Stairs allowed

## 2019-06-23 NOTE — PROGRESS NOTE ADULT - SUBJECTIVE AND OBJECTIVE BOX
Pt seen and examined at bedside. Pt is s/p Ovarian Mass Rsxn, tolerating PO, reports Zero pain today. PO pain meds ordered. Will sign off. Reconsult if needed. TY

## 2019-06-23 NOTE — PROGRESS NOTE ADULT - ASSESSMENT
27 yo s/p ex lap and left salpingectomy for likely old ruptured ectopic pregnancy and myomectomy. Pt doing well post-operatively.     - PO Percocet for pain control  - SCD for DVT prophylaxis   - encourage ambulation  - encourage ISS    Asthma  - continue albuterol PRN  - asymptomatic

## 2019-06-23 NOTE — DISCHARGE NOTE PROVIDER - NSDCFUADDINST_GEN_ALL_CORE_FT
Please contact your provider for any pain uncontrolled by medication, excessive bleeding or Fever>100.4  Please take aleve-1 tablet every 12 hours x 3 days, may take percocet as prescribed for breakthrough pain.  Please take colace three times a day. Nothing per vagina x 6 weeks.   Please contact your provider for any pain uncontrolled by medication, excessive bleeding or Fever>100.4  Please take aleve-1 tablet every 12 hours x 3 days, may take percocet as prescribed for breakthrough pain.  Please take colace three times a day. Nothing per vagina x 2 weeks.   Please contact your provider for any pain uncontrolled by medication, excessive bleeding or Fever>100.4  Please take aleve-1 tablet every 12 hours x 3 days, may take percocet as prescribed for breakthrough pain.  Please take colace three times a day.

## 2019-06-23 NOTE — DISCHARGE NOTE PROVIDER - HOSPITAL COURSE
Patient post-operatively had an uncomplicated hospital course. Her pain was well controlled. She is tolerating a regular diet. She is ambulating independently. She was able to void after removal of petersen. Patient with flatus. Labs and Vitals WNL upon discharge.

## 2019-06-23 NOTE — DISCHARGE NOTE PROVIDER - CARE PROVIDER_API CALL
Negra Rodriguez)  [facility]  [address] [place] [address]  Phone: [phone]  Fax: [phone]  Follow Up Time:

## 2019-06-23 NOTE — PROGRESS NOTE ADULT - SUBJECTIVE AND OBJECTIVE BOX
GYNECOLOGIC ONCOLOGY PROGRESS NOTE    POD#2    PROBLEMS:  Ovarian mass, left      Pt seen and examined at bedside. Patients  is english speaking and assisted in conversation. Reports no events overnight.     SUBJECTIVE:    Patient is without complaints.  Pain well-controlled.  Flatus: no, BM: yes x 2  Denies Nausea, Vomiting or Diarrhea.  Denies shortness of breath, chest pain or dyspnea on exertion.  Tolerating diet.    OBJECTIVE:     VITALS:  T(F): 99.2 (06-23-19 @ 04:42), Max: 99.8 (06-22-19 @ 20:00)  HR: 90 (06-23-19 @ 04:42) (62 - 100)  BP: 99/70 (06-23-19 @ 04:42) (94/62 - 115/72)  RR: 18 (06-23-19 @ 04:42) (18 - 18)  SpO2: 98% (06-23-19 @ 04:42) (96% - 100%)      I&O's Summary    22 Jun 2019 07:01  -  23 Jun 2019 07:00  --------------------------------------------------------  IN: 2750 mL / OUT: 500 mL / NET: 2250 mL        MEDICATIONS  (STANDING):  docusate sodium 100 milliGRAM(s) Oral two times a day  lactated ringers. 1000 milliLiter(s) (125 mL/Hr) IV Continuous <Continuous>    MEDICATIONS  (PRN):  ALBUTerol    90 MICROgram(s) HFA Inhaler 2 Puff(s) Inhalation every 6 hours PRN Shortness of Breath and/or Wheezing  ketorolac   Injectable 15 milliGRAM(s) IV Push every 6 hours PRN Moderate Pain (4 - 6)  morphine  - Injectable 2 milliGRAM(s) IV Push every 4 hours PRN Severe Pain (7 - 10)  oxyCODONE    5 mG/acetaminophen 325 mG 1 Tablet(s) Oral every 4 hours PRN Moderate Pain (4 - 6)  oxyCODONE    5 mG/acetaminophen 325 mG 2 Tablet(s) Oral every 4 hours PRN Severe Pain (7 - 10)      Physical Exam:  Constitutional: NAD  Pulmonary: clear to auscultation bilaterally   Cardiovascular: Regular rate and rhythm   Abdomen: soft, non-tender, non-distended, normal bowel sounds  Extremities: no lower extremity edema or calve tenderness, Porter's sign negative.  Incision: Clean, dry, intact.  Without signs of infection or hernia.      LABS: AM labs pending

## 2019-06-23 NOTE — PROGRESS NOTE ADULT - ASSESSMENT
28 yrs female with Hx of mild Asthma found to have large L adnexal mass. She is s/p Exploratory laparotomy With lt salpingectomy.     # Lt adnexal mass s/p Lt salpingectomy: pain control,   PT, Bowel regimen , IS, DVT px per Gyn team    # post procedural hypotension - most likely 2/2 narcotics - responded to IV fluids bolus - resolved    # Asthma - mild - not in flare - supportive care   # leucocytosis - most likely reactive

## 2019-06-23 NOTE — PROGRESS NOTE ADULT - SUBJECTIVE AND OBJECTIVE BOX
LORIN KRAFT    [id number]    28y      Female      Lt adnexal mass s/p Exp lap with salpingectomy POD#2    CC:  feels better, pain is well controlled but feels pain which she is gassy.    denies nausea or vomiting. tolerating diet. Denies any dizziness today.  No other complaints    INTERVAL HPI/OVERNIGHT EVENTS: no acute events     REVIEW OF SYSTEMS:    CONSTITUTIONAL: No fever or fatigue  RESPIRATORY: No cough, wheezing, No shortness of breath  CARDIOVASCULAR: No chest pain, palpitations  GASTROINTESTINAL: No abdominal or epigastric pain. No vomiting      Vital Signs Last 24 Hrs  T(C): 37.2 (23 Jun 2019 08:42), Max: 37.7 (22 Jun 2019 20:00)  T(F): 99 (23 Jun 2019 08:42), Max: 99.8 (22 Jun 2019 20:00)  HR: 75 (23 Jun 2019 08:42) (70 - 100)  BP: 98/69 (23 Jun 2019 08:42) (94/62 - 115/72)  BP(mean): --  RR: 18 (23 Jun 2019 08:42) (18 - 18)  SpO2: 99% (23 Jun 2019 08:42) (96% - 100%)        PHYSICAL EXAM:    GENERAL: NAD, well-groomed  HEENT: PERRL, +EOMI  NECK: soft, Supple   CHEST/LUNG: Clear to percussion bilaterally; No wheezing  HEART: S1S2+, Regular rate and rhythm; No murmurs  ABDOMEN: Soft, Nontender, Nondistended; Bowel sounds present , surgical wound clean - staples+  EXTREMITIES:  No clubbing, cyanosis, or edema  SKIN: No rashes or lesions  NEURO: AAOX3              06-22 @ 07:01  -  06-23 @ 07:00  --------------------------------------------------------  IN: 2750 mL / OUT: 500 mL / NET: 2250 mL        LABS:                        9.8    14.7  )-----------( 323      ( 23 Jun 2019 07:11 )             30.9     06-23    139  |  105  |  6.0<L>  ----------------------------<  78  4.1   |  24.0  |  0.56    Ca    8.2<L>      23 Jun 2019 07:11              MEDICATIONS  (STANDING):  docusate sodium 100 milliGRAM(s) Oral two times a day  naproxen 500 milliGRAM(s) Oral every 12 hours    MEDICATIONS  (PRN):  ALBUTerol    90 MICROgram(s) HFA Inhaler 2 Puff(s) Inhalation every 6 hours PRN Shortness of Breath and/or Wheezing  morphine  - Injectable 2 milliGRAM(s) IV Push every 4 hours PRN Severe Pain (7 - 10)  oxyCODONE    5 mG/acetaminophen 325 mG 1 Tablet(s) Oral every 4 hours PRN Moderate Pain (4 - 6)  oxyCODONE    5 mG/acetaminophen 325 mG 2 Tablet(s) Oral every 4 hours PRN Severe Pain (7 - 10)      RADIOLOGY & ADDITIONAL TESTS:

## 2019-06-24 ENCOUNTER — TRANSCRIPTION ENCOUNTER (OUTPATIENT)
Age: 29
End: 2019-06-24

## 2019-06-24 VITALS
DIASTOLIC BLOOD PRESSURE: 65 MMHG | OXYGEN SATURATION: 97 % | TEMPERATURE: 99 F | SYSTOLIC BLOOD PRESSURE: 97 MMHG | HEART RATE: 65 BPM | RESPIRATION RATE: 18 BRPM

## 2019-06-24 LAB
ANION GAP SERPL CALC-SCNC: 11 MMOL/L — SIGNIFICANT CHANGE UP (ref 5–17)
BASOPHILS # BLD AUTO: 0 K/UL — SIGNIFICANT CHANGE UP (ref 0–0.2)
BASOPHILS NFR BLD AUTO: 0.2 % — SIGNIFICANT CHANGE UP (ref 0–2)
BUN SERPL-MCNC: 9 MG/DL — SIGNIFICANT CHANGE UP (ref 8–20)
CALCIUM SERPL-MCNC: 8.5 MG/DL — LOW (ref 8.6–10.2)
CHLORIDE SERPL-SCNC: 101 MMOL/L — SIGNIFICANT CHANGE UP (ref 98–107)
CO2 SERPL-SCNC: 25 MMOL/L — SIGNIFICANT CHANGE UP (ref 22–29)
CREAT SERPL-MCNC: 0.48 MG/DL — LOW (ref 0.5–1.3)
EOSINOPHIL # BLD AUTO: 0.5 K/UL — SIGNIFICANT CHANGE UP (ref 0–0.5)
EOSINOPHIL NFR BLD AUTO: 4.1 % — SIGNIFICANT CHANGE UP (ref 0–6)
GLUCOSE SERPL-MCNC: 85 MG/DL — SIGNIFICANT CHANGE UP (ref 70–115)
HCT VFR BLD CALC: 30.6 % — LOW (ref 37–47)
HGB BLD-MCNC: 10.3 G/DL — LOW (ref 12–16)
LYMPHOCYTES # BLD AUTO: 2.6 K/UL — SIGNIFICANT CHANGE UP (ref 1–4.8)
LYMPHOCYTES # BLD AUTO: 20.5 % — SIGNIFICANT CHANGE UP (ref 20–55)
MCHC RBC-ENTMCNC: 30.6 PG — SIGNIFICANT CHANGE UP (ref 27–31)
MCHC RBC-ENTMCNC: 33.7 G/DL — SIGNIFICANT CHANGE UP (ref 32–36)
MCV RBC AUTO: 90.8 FL — SIGNIFICANT CHANGE UP (ref 81–99)
MONOCYTES # BLD AUTO: 0.7 K/UL — SIGNIFICANT CHANGE UP (ref 0–0.8)
MONOCYTES NFR BLD AUTO: 5.9 % — SIGNIFICANT CHANGE UP (ref 3–10)
NEUTROPHILS # BLD AUTO: 8.7 K/UL — HIGH (ref 1.8–8)
NEUTROPHILS NFR BLD AUTO: 69.2 % — SIGNIFICANT CHANGE UP (ref 37–73)
PLATELET # BLD AUTO: 356 K/UL — SIGNIFICANT CHANGE UP (ref 150–400)
POTASSIUM SERPL-MCNC: 4 MMOL/L — SIGNIFICANT CHANGE UP (ref 3.5–5.3)
POTASSIUM SERPL-SCNC: 4 MMOL/L — SIGNIFICANT CHANGE UP (ref 3.5–5.3)
RBC # BLD: 3.37 M/UL — LOW (ref 4.4–5.2)
RBC # FLD: 12.8 % — SIGNIFICANT CHANGE UP (ref 11–15.6)
SODIUM SERPL-SCNC: 137 MMOL/L — SIGNIFICANT CHANGE UP (ref 135–145)
WBC # BLD: 12.5 K/UL — HIGH (ref 4.8–10.8)
WBC # FLD AUTO: 12.5 K/UL — HIGH (ref 4.8–10.8)

## 2019-06-24 PROCEDURE — 99232 SBSQ HOSP IP/OBS MODERATE 35: CPT

## 2019-06-24 RX ORDER — ACETAMINOPHEN 500 MG
650 TABLET ORAL ONCE
Refills: 0 | Status: COMPLETED | OUTPATIENT
Start: 2019-06-24 | End: 2019-06-24

## 2019-06-24 RX ADMIN — OXYCODONE AND ACETAMINOPHEN 2 TABLET(S): 5; 325 TABLET ORAL at 03:30

## 2019-06-24 RX ADMIN — Medication 100 MILLIGRAM(S): at 05:31

## 2019-06-24 RX ADMIN — Medication 500 MILLIGRAM(S): at 05:31

## 2019-06-24 RX ADMIN — Medication 500 MILLIGRAM(S): at 06:40

## 2019-06-24 RX ADMIN — OXYCODONE AND ACETAMINOPHEN 2 TABLET(S): 5; 325 TABLET ORAL at 02:53

## 2019-06-24 NOTE — PROGRESS NOTE ADULT - ASSESSMENT
27 yo s/p ex lap and left salpingectomy for likely old ruptured ectopic pregnancy and myomectomy. Pt doing well post-operatively. Patient reports her pain is well controlled, tolerating regular diet, ambulating independently, voiding spontaneously. Reports flatus and BM.     - PO Percocet and naproxen for pain control  - Colace for constipation  - SCD for DVT prophylaxis   - encourage ambulation  - encourage ISS    Asthma  - continue albuterol PRN  - asymptomatic     Will discuss discharge to home with Dr. Rodriguez.

## 2019-06-24 NOTE — DISCHARGE NOTE NURSING/CASE MANAGEMENT/SOCIAL WORK - NSDCDPATPORTLINK_GEN_ALL_CORE
You can access the [facility] Patient Portal, offered by [facility], by registering with the following website: [email or web address]

## 2019-06-24 NOTE — PROGRESS NOTE ADULT - SUBJECTIVE AND OBJECTIVE BOX
LORIN KRAFT    [id number]    28y      Female      CC: Abdominal pain s/p exp laparotomy with salpingectomy  Doing well, tolerating diet, no other issues reported   plans to go home today      INTERVAL HPI/OVERNIGHT EVENTS: no acute events     REVIEW OF SYSTEMS:    CONSTITUTIONAL: No fever, fatigue  RESPIRATORY: No cough, wheezing, No shortness of breath  CARDIOVASCULAR: No chest pain, palpitations  GASTROINTESTINAL: No abdominal or epigastric pain. No nausea, vomiting        Vital Signs Last 24 Hrs  T(C): 37.1 (24 Jun 2019 07:35), Max: 37.3 (23 Jun 2019 16:22)  T(F): 98.8 (24 Jun 2019 07:35), Max: 99.1 (23 Jun 2019 16:22)  HR: 65 (24 Jun 2019 07:35) (65 - 97)  BP: 97/65 (24 Jun 2019 07:35) (82/56 - 110/76)  BP(mean): --  RR: 18 (24 Jun 2019 07:35) (18 - 18)  SpO2: 97% (24 Jun 2019 07:35) (95% - 98%)    PHYSICAL EXAM:    GENERAL: NAD, well-groomed  HEENT: PERRL, +EOMI  NECK: soft, Supple  CHEST/LUNG: Clear to percussion bilaterally; No wheezing  HEART: S1S2+, Regular rate and rhythm; No murmur  ABDOMEN: Soft, Nontender, Nondistended; Bowel sounds present  EXTREMITIES:  No clubbing, cyanosis, or edema  NEURO: AAOX3      LABS:                        10.3   12.5  )-----------( 356      ( 24 Jun 2019 05:33 )             30.6     06-24    137  |  101  |  9.0  ----------------------------<  85  4.0   |  25.0  |  0.48<L>    Ca    8.5<L>      24 Jun 2019 05:33              MEDICATIONS  (STANDING):  docusate sodium 100 milliGRAM(s) Oral two times a day  naproxen 500 milliGRAM(s) Oral every 12 hours    MEDICATIONS  (PRN):  ALBUTerol    90 MICROgram(s) HFA Inhaler 2 Puff(s) Inhalation every 6 hours PRN Shortness of Breath and/or Wheezing  morphine  - Injectable 2 milliGRAM(s) IV Push every 4 hours PRN Severe Pain (7 - 10)  oxyCODONE    5 mG/acetaminophen 325 mG 1 Tablet(s) Oral every 4 hours PRN Moderate Pain (4 - 6)  oxyCODONE    5 mG/acetaminophen 325 mG 2 Tablet(s) Oral every 4 hours PRN Severe Pain (7 - 10)      RADIOLOGY & ADDITIONAL TESTS:

## 2019-06-24 NOTE — PROGRESS NOTE ADULT - REASON FOR ADMISSION
ovarian mass with pain

## 2019-06-24 NOTE — PROGRESS NOTE ADULT - ASSESSMENT
28 yrs female with Hx of mild Asthma found to have large L adnexal mass. She is s/p Exploratory laparotomy With lt salpingectomy.     # Lt adnexal mass s/p Lt salpingectomy: Bowel regimen , f/u with gyn     # post procedural hypotension - most likely 2/2 narcotics - responded to IV fluids bolus - resolved    # Asthma - mild - not in flare - supportive care   # leucocytosis - most likely reactive - improving    Medically stable for discharge

## 2019-06-24 NOTE — PROGRESS NOTE ADULT - SUBJECTIVE AND OBJECTIVE BOX
GYNECOLOGIC ONCOLOGY PROGRESS NOTE    POD#3    PROBLEMS:  Ovarian mass, left      Pt seen and examined at bedside. Patient reports headache at this time. Reports no overnight events.     SUBJECTIVE:    Patient is without complaints.  Pain well-controlled.  Flatus: yes, BM yes  Denies Nausea, Vomiting or Diarrhea.  Denies shortness of breath, chest pain or dyspnea on exertion.  Tolerating diet.    OBJECTIVE:     VITALS:  T(F): 98.8 (06-24-19 @ 07:35), Max: 99.1 (06-23-19 @ 16:22)  HR: 65 (06-24-19 @ 07:35) (65 - 97)  BP: 97/65 (06-24-19 @ 07:35) (82/56 - 110/76)  RR: 18 (06-24-19 @ 07:35) (18 - 18)  SpO2: 97% (06-24-19 @ 07:35) (95% - 99%)      I&O's Summary      MEDICATIONS  (STANDING):  acetaminophen   Tablet .. 650 milliGRAM(s) Oral once  docusate sodium 100 milliGRAM(s) Oral two times a day  naproxen 500 milliGRAM(s) Oral every 12 hours    MEDICATIONS  (PRN):  ALBUTerol    90 MICROgram(s) HFA Inhaler 2 Puff(s) Inhalation every 6 hours PRN Shortness of Breath and/or Wheezing  morphine  - Injectable 2 milliGRAM(s) IV Push every 4 hours PRN Severe Pain (7 - 10)  oxyCODONE    5 mG/acetaminophen 325 mG 1 Tablet(s) Oral every 4 hours PRN Moderate Pain (4 - 6)  oxyCODONE    5 mG/acetaminophen 325 mG 2 Tablet(s) Oral every 4 hours PRN Severe Pain (7 - 10)      Physical Exam:  Constitutional: NAD  Pulmonary: clear to auscultation bilaterally   Cardiovascular: Regular rate and rhythm   Abdomen: soft, non-tender, non-distended, normal bowel sounds  Extremities: no lower extremity edema or calve tenderness, Porter's sign negative.  Incision: Clean, dry, staples intact.  Without signs of infection or hernia.      LABS: AM labs reviewed                        10.3   12.5  )-----------( 356      ( 24 Jun 2019 05:33 )             30.6     06-24    137  |  101  |  9.0  ----------------------------<  85  4.0   |  25.0  |  0.48<L>    Ca    8.5<L>      24 Jun 2019 05:33            RADIOLOGY & ADDITIONAL TESTS:

## 2019-06-25 LAB — SURGICAL PATHOLOGY STUDY: SIGNIFICANT CHANGE UP

## 2019-06-28 PROBLEM — N83.8 OTHER NONINFLAMMATORY DISORDERS OF OVARY, FALLOPIAN TUBE AND BROAD LIGAMENT: Chronic | Status: ACTIVE | Noted: 2019-06-20

## 2019-07-02 ENCOUNTER — APPOINTMENT (OUTPATIENT)
Dept: GYNECOLOGIC ONCOLOGY | Facility: CLINIC | Age: 29
End: 2019-07-02

## 2019-07-02 ENCOUNTER — EMERGENCY (EMERGENCY)
Facility: HOSPITAL | Age: 29
LOS: 1 days | End: 2019-07-02
Attending: EMERGENCY MEDICINE
Payer: MEDICAID

## 2019-07-02 VITALS
DIASTOLIC BLOOD PRESSURE: 76 MMHG | RESPIRATION RATE: 18 BRPM | HEART RATE: 82 BPM | TEMPERATURE: 98 F | OXYGEN SATURATION: 99 % | SYSTOLIC BLOOD PRESSURE: 109 MMHG

## 2019-07-02 VITALS — HEIGHT: 60 IN | WEIGHT: 192.9 LBS

## 2019-07-02 DIAGNOSIS — Z51.89 ENCOUNTER FOR OTHER SPECIFIED AFTERCARE: ICD-10-CM

## 2019-07-02 PROCEDURE — G0463: CPT

## 2019-07-02 PROCEDURE — T1013: CPT

## 2019-07-02 PROCEDURE — 99024 POSTOP FOLLOW-UP VISIT: CPT

## 2019-07-02 NOTE — CONSULT NOTE ADULT - ASSESSMENT
27 yo s/p ex lap and left salpingectomy for likely old ruptured ectopic pregnancy and myomectomy presents for suture removal.

## 2019-07-02 NOTE — ED ADULT TRIAGE NOTE - CHIEF COMPLAINT QUOTE
Pt has left fallopian tube removal last week due to an ectopic pregnancy. Pt was supposed to go the office today to have the staples removed but the doctor was called here for an emergency. Pt is meeting him here for the staple removal.

## 2019-07-02 NOTE — CONSULT NOTE ADULT - SUBJECTIVE AND OBJECTIVE BOX
GYNECOLOGIC ONCOLOGY CONSULT NOTE    LORIN rios 28yyo    presents with HPI: Pt is s/p ex lap and left salpingectomy for likely old ruptured ectopic pregnancy and myomectomy on 6/22, POD 11. Patie      OB/GYN HISTORY:     Surgical History:    No significant past surgical history      Past Medical History:   Enlarged ovary      No Known Allergies          FAMILY HISTORY:  Family history of diabetes mellitus      Social History:     REVIEW OF SYSTEMS:    CONSTITUTIONAL: No fever, weight loss, or fatigue  EYES: No eye pain, visual disturbances, or discharge  ENMT:  No difficulty hearing, tinnitus, vertigo; No sinus or throat pain  NECK: No pain or stiffness  BREASTS: No pain, masses, or nipple discharge  RESPIRATORY: No cough, wheezing, chills or hemoptysis; No shortness of breath  CARDIOVASCULAR: No chest pain, palpitations, dizziness, or leg swelling  GASTROINTESTINAL: No abdominal or epigastric pain. No nausea, vomiting, or hematemesis; No diarrhea or constipation. No melena or hematochezia.  GENITOURINARY: No dysuria, frequency, hematuria, or incontinence  NEUROLOGICAL: No headaches, memory loss, loss of strength, numbness, or tremors  SKIN: No itching, burning, rashes, or lesions   LYMPH NODES: No enlarged glands  ENDOCRINE: No heat or cold intolerance; No hair loss  MUSCULOSKELETAL: No joint pain or swelling; No muscle, back, or extremity pain  PSYCHIATRIC: No depression, anxiety, mood swings, or difficulty sleeping  HEME/LYMPH: No easy bruising, or bleeding gums  ALLERY AND IMMUNOLOGIC: No hives or eczema    MEDICATIONS  (STANDING):    MEDICATIONS  (PRN):      OBJECTIVE FINDINGS:    Vital Signs Last 24 Hrs  T(C): 36.8 (02 Jul 2019 09:48), Max: 36.8 (02 Jul 2019 09:48)  T(F): 98.3 (02 Jul 2019 09:48), Max: 98.3 (02 Jul 2019 09:48)  HR: 82 (02 Jul 2019 09:48) (82 - 82)  BP: 109/76 (02 Jul 2019 09:48) (109/76 - 109/76)  BP(mean): --  RR: 18 (02 Jul 2019 09:48) (18 - 18)  SpO2: 99% (02 Jul 2019 09:48) (99% - 99%)    PHYSICAL EXAM:    GENERAL: NAD, well-developed  HEAD:  Atraumatic, Normocephalic  EYES: EOMI, PERRLA, conjunctiva and sclera clear  ENMT: No tonsillar erythema, exudates, or enlargement; Moist mucous membranes, Good dentition, No lesions  NECK: Supple, No JVD, Normal thyroid  NERVOUS SYSTEM:  Alert & Oriented X3, Good concentration; Motor Strength 5/5 B/L upper and lower extremities; DTRs 2+ intact and symmetric  CHEST/LUNG: Clear to percussion bilaterally; No rales, rhonchi, wheezing, or rubs  HEART: Regular rate and rhythm; No murmurs, rubs, or gallops  ABDOMEN: Soft, Nontender, Nondistended; Bowel sounds present, No rebound, No guarding  EXTREMITIES:  2+ Peripheral Pulses, No clubbing, cyanosis, or edema, Porter's sign negative  LYMPH: No lymphadenopathy noted  SKIN: No rashes or lesions  PELVIC:   RECTAL:    LABS:                RADIOLOGY & ADDITIONAL STUDIES: GYNECOLOGIC ONCOLOGY CONSULT NOTE    LORIN Trujillo a 28yyo    presents with HPI: Pt is s/p ex lap and left salpingectomy for likely old ruptured ectopic pregnancy and myomectomy on 6/22, POD 11. Patient had appointment with Dr. Rodriguez today, was unable to be seen. Patient was to have staples removed at her post op visit. Denies fever, chills, SOB, calf pain, abdominal pain, nausea/vomiting.       OB/GYN HISTORY:     Surgical History:    No significant past surgical history      Past Medical History:   Enlarged ovary      No Known Allergies          FAMILY HISTORY:  Family history of diabetes mellitus      Social History:     REVIEW OF SYSTEMS:    CONSTITUTIONAL: No fever, weight loss, or fatigue  EYES: No eye pain, visual disturbances, or discharge  ENMT:  No difficulty hearing, tinnitus, vertigo; No sinus or throat pain  NECK: No pain or stiffness  BREASTS: No pain, masses, or nipple discharge  RESPIRATORY: No cough, wheezing, chills or hemoptysis; No shortness of breath  CARDIOVASCULAR: No chest pain, palpitations, dizziness, or leg swelling  GASTROINTESTINAL: No abdominal or epigastric pain. No nausea, vomiting, or hematemesis; No diarrhea or constipation. No melena or hematochezia.  GENITOURINARY: No dysuria, frequency, hematuria, or incontinence  NEUROLOGICAL: No headaches, memory loss, loss of strength, numbness, or tremors  SKIN: No itching, burning, rashes, or lesions   MUSCULOSKELETAL: No joint pain or swelling; No muscle, back, or extremity pain  PSYCHIATRIC: No depression, anxiety, mood swings, or difficulty sleeping      MEDICATIONS  (STANDING):    MEDICATIONS  (PRN):      OBJECTIVE FINDINGS:    Vital Signs Last 24 Hrs  T(C): 36.8 (02 Jul 2019 09:48), Max: 36.8 (02 Jul 2019 09:48)  T(F): 98.3 (02 Jul 2019 09:48), Max: 98.3 (02 Jul 2019 09:48)  HR: 82 (02 Jul 2019 09:48) (82 - 82)  BP: 109/76 (02 Jul 2019 09:48) (109/76 - 109/76)  RR: 18 (02 Jul 2019 09:48) (18 - 18)  SpO2: 99% (02 Jul 2019 09:48) (99% - 99%)    PHYSICAL EXAM:    GENERAL: NAD, well-developed  HEAD:  Atraumatic, Normocephalic  EYES: EOMI, PERRLA, conjunctiva and sclera clear  ENMT: No tonsillar erythema, exudates, or enlargement; Moist mucous membranes, Good dentition, No lesions  NECK: Supple, No JVD, Normal thyroid  NERVOUS SYSTEM:  Alert & Oriented X3, Good concentration; Motor Strength 5/5 B/L upper and lower extremities; DTRs 2+ intact and symmetric  CHEST/LUNG: Clear to percussion bilaterally; No rales, rhonchi, wheezing, or rubs  HEART: Regular rate and rhythm; No murmurs, rubs, or gallops  ABDOMEN: Soft, Nontender, Nondistended; Bowel sounds present, No rebound, No guarding  EXTREMITIES:  2+ Peripheral Pulses, No clubbing, cyanosis, or edema, Porter's sign negative  LYMPH: No lymphadenopathy noted  SKIN: No rashes or lesions  INCISION: healing well, intact, with surgical sutures in place. No signs of infection. Minimal ecchymosis noted on lower abdomen. No errythema, no drainage.

## 2019-07-02 NOTE — ED STATDOCS - CARE PLAN
Principal Discharge DX:	Encounter for wound re-check Principal Discharge DX:	Encounter for wound re-check  Secondary Diagnosis:	Removal of staples

## 2019-07-02 NOTE — ED STATDOCS - ATTENDING CONTRIBUTION TO CARE
I, Kj Cruz, performed the initial face to face bedside interview with this patient regarding history of present illness, review of symptoms and relevant past medical, social and family history.  I completed an independent physical examination.  I was the provider who initially evaluated this patient.  Follow-up on ordered tests (ie labs, radiologic studies) and re-evaluation of the patient's status has been communicated to the ACP.  Disposition of the patient will be based on test outcome and response to ED interventions.

## 2019-07-02 NOTE — CONSULT NOTE ADULT - PROBLEM SELECTOR RECOMMENDATION 9
ED removed every other suture  Patient to return to office next week to remove remaining sutures and review pathology with Dr. Rodriguez  Patient instructed if any symptoms arise to return to ED/office sooner.     Discussed with Dr. Rodriguez

## 2019-07-02 NOTE — ED STATDOCS - OBJECTIVE STATEMENT
POD #11 from gynecologic surgery, scheduled for staple removal today but physician unable to attend office hours.   Reports no problems: denies fever, wound discharge, wound rednesss.  Abd sore but not painful.    Gyn: [facility]  : Enzo.

## 2019-07-02 NOTE — ED STATDOCS - PROGRESS NOTE DETAILS
case d/w gyn PA for evalaution prior to staple removal. PT evaluated by intake physician. HPI/ROS/PE as noted above. Will follow up plan per intake physician OB evaluated PT and request every other staple to be removed and follow up in office in one week for remainder

## 2019-07-10 ENCOUNTER — APPOINTMENT (OUTPATIENT)
Dept: GYNECOLOGIC ONCOLOGY | Facility: CLINIC | Age: 29
End: 2019-07-10
Payer: MEDICAID

## 2019-07-10 PROCEDURE — 83520 IMMUNOASSAY QUANT NOS NONAB: CPT

## 2019-07-10 PROCEDURE — 86301 IMMUNOASSAY TUMOR CA 19-9: CPT

## 2019-07-10 PROCEDURE — 82105 ALPHA-FETOPROTEIN SERUM: CPT

## 2019-07-10 PROCEDURE — 71045 X-RAY EXAM CHEST 1 VIEW: CPT

## 2019-07-10 PROCEDURE — 83615 LACTATE (LD) (LDH) ENZYME: CPT

## 2019-07-10 PROCEDURE — 96374 THER/PROPH/DIAG INJ IV PUSH: CPT

## 2019-07-10 PROCEDURE — 86900 BLOOD TYPING SEROLOGIC ABO: CPT

## 2019-07-10 PROCEDURE — 76830 TRANSVAGINAL US NON-OB: CPT

## 2019-07-10 PROCEDURE — 84702 CHORIONIC GONADOTROPIN TEST: CPT

## 2019-07-10 PROCEDURE — 86923 COMPATIBILITY TEST ELECTRIC: CPT

## 2019-07-10 PROCEDURE — 85730 THROMBOPLASTIN TIME PARTIAL: CPT

## 2019-07-10 PROCEDURE — 86304 IMMUNOASSAY TUMOR CA 125: CPT

## 2019-07-10 PROCEDURE — 82627 DEHYDROEPIANDROSTERONE: CPT

## 2019-07-10 PROCEDURE — 85610 PROTHROMBIN TIME: CPT

## 2019-07-10 PROCEDURE — 82378 CARCINOEMBRYONIC ANTIGEN: CPT

## 2019-07-10 PROCEDURE — T1013: CPT

## 2019-07-10 PROCEDURE — 36415 COLL VENOUS BLD VENIPUNCTURE: CPT

## 2019-07-10 PROCEDURE — 80048 BASIC METABOLIC PNL TOTAL CA: CPT

## 2019-07-10 PROCEDURE — 80053 COMPREHEN METABOLIC PANEL: CPT

## 2019-07-10 PROCEDURE — 88305 TISSUE EXAM BY PATHOLOGIST: CPT

## 2019-07-10 PROCEDURE — 85027 COMPLETE CBC AUTOMATED: CPT

## 2019-07-10 PROCEDURE — 86850 RBC ANTIBODY SCREEN: CPT

## 2019-07-10 PROCEDURE — 76856 US EXAM PELVIC COMPLETE: CPT

## 2019-07-10 PROCEDURE — 86901 BLOOD TYPING SEROLOGIC RH(D): CPT

## 2019-07-10 PROCEDURE — 99024 POSTOP FOLLOW-UP VISIT: CPT

## 2019-07-10 PROCEDURE — 81001 URINALYSIS AUTO W/SCOPE: CPT

## 2019-07-10 PROCEDURE — 86336 INHIBIN A: CPT

## 2019-07-10 PROCEDURE — 81025 URINE PREGNANCY TEST: CPT

## 2019-07-10 PROCEDURE — 99285 EMERGENCY DEPT VISIT HI MDM: CPT | Mod: 25

## 2019-07-10 PROCEDURE — 82962 GLUCOSE BLOOD TEST: CPT

## 2019-07-10 PROCEDURE — 72197 MRI PELVIS W/O & W/DYE: CPT

## 2019-07-10 RX ORDER — NORETHINDRONE ACETATE AND ETHINYL ESTRADIOL 1; .02 MG/1; MG/1
1-20 TABLET ORAL DAILY
Qty: 30 | Refills: 0 | Status: ACTIVE | COMMUNITY
Start: 2019-07-10 | End: 1900-01-01

## 2019-07-10 NOTE — DISCUSSION/SUMMARY
[Clean] : was clean [Erythema] : was not erythematous [Dry] : was dry [None] : had no drainage [Seroma] : had no seroma [Ecchymosis] : was not ecchymotic [Firm] : soft [Tender] : nontender [Normal Skin] : normal appearance [Guarding] : no guarding [Rebound] : no rebound tenderness [Abnormal Bowel Sounds] : normal bowel sounds [Mass] : no palpable mass [Incisional Hernia] : no incisional hernia [No Sign of Infection] : is showing no signs of infection [Excellent Pain Control] : has excellent pain control [Doing Well] : is doing well [0] : 0

## 2019-07-10 NOTE — ASSESSMENT
[FreeTextEntry1] : Pt is a 28 yo s/p ex-lap, left salpingectomy for an ectopic pregnancy. Recovering well.

## 2019-07-10 NOTE — REASON FOR VISIT
[Post Op] : post op visit [de-identified] : 6/5/2019 [de-identified] : Pt presents for first post-op check. She reports doing well overall. She has no fevers/chills, no nausea/vomiting. She desires contraception for 8 months to heal from surgery and they will try to get pregnant again.  [de-identified] : Ex-lap, left salpingectomy

## 2019-08-20 NOTE — PHYSICAL EXAM
[Normal] : Parametria: Normal [Abnormal] : Adnexa(ae): Abnormal [de-identified] : pain elicited on bimanual examination of left adenxa [de-identified] : Patient was interviewed and examined with chaperone present. Name of Chaperone: Ivonne Ascencio PA-C

## 2019-08-20 NOTE — HISTORY OF PRESENT ILLNESS
[FreeTextEntry1] : This 27 y/o with 3-4 questionable miscarriages in the past for one which she reports was an ectopic pregnancy, LMP 4/2019 being referred by Dr. hSarp for abnormal US. Patient reports she was recently told she was pregnant in 4/2019, but beta HCG was downtrending, she reports last betaHcg was negative. 6/18/19 US revealed fibroid uterus, complex cystic mass seen anterior left = 97 x 65 x 81mm - minimal blood flow seen within surrounding  tissue. Cystic structure seen lateral to mass = 69 x 26 x 58mm. No free fluid was seen and no gestation sac was seen. Patient reports pain in her LLQ and left lower pelvic area for the past month, describes it as waxing and weaning.  \par \par Of note patient recently had a left under arm sebaceous cyst drained and removed, for which she is on Bactrim. \par \par

## 2019-08-20 NOTE — ASSESSMENT
[FreeTextEntry1] : This 28 year old female with recent spontaneous  presenting to our office for evaluation of complex cystic mass noted on the left ovary. US was not done in office today, imaging from US done yesterday  was reviewed. Physical examination in office revealed that patient was in pain which she described occurring in her LLQ and pelvic area. Upon bimanual examination she had pain with palpation of left adnexa. Discussed with the patient that I am concerned of ovarian torsion at this time. I am recommending that she go to [facility] vs.  for further evaluation and discussed she may possibly need a emergent surgery later today or early tomorrow morning if it is a torsion. Discussed the mechanism behind an ovarian torsion and how it would be causing her symptoms.\par \par Discussed with the patient that if imaging does not reveal a torsion a MRI would be recommended to follow up this complex cystic structure. I discussed possibility of further surgical intervention laparoscopic vs. more likely laparotomy to explore if it is concerning on MRI and tumor markers were elevated.\par \par Patient and  agreeable to immediately go to [facility] for rule out torsion. [facility] gyn/onc staff made aware.  
Normal

## 2019-08-20 NOTE — CHIEF COMPLAINT
[FreeTextEntry1] : [facility]\par \par [facility] [phone] at [address] [place], [place] [id number]

## 2020-08-12 ENCOUNTER — TRANSCRIPTION ENCOUNTER (OUTPATIENT)
Age: 30
End: 2020-08-12

## 2020-08-13 ENCOUNTER — RESULT REVIEW (OUTPATIENT)
Age: 30
End: 2020-08-13

## 2020-08-13 ENCOUNTER — INPATIENT (INPATIENT)
Facility: HOSPITAL | Age: 30
LOS: 0 days | Discharge: HOME | DRG: 817 | End: 2020-08-13
Attending: SPECIALIST | Admitting: SPECIALIST
Payer: MEDICAID

## 2020-08-13 VITALS
OXYGEN SATURATION: 100 % | DIASTOLIC BLOOD PRESSURE: 72 MMHG | RESPIRATION RATE: 17 BRPM | SYSTOLIC BLOOD PRESSURE: 104 MMHG | HEART RATE: 60 BPM

## 2020-08-13 VITALS
RESPIRATION RATE: 18 BRPM | HEART RATE: 87 BPM | WEIGHT: 179.9 LBS | DIASTOLIC BLOOD PRESSURE: 87 MMHG | OXYGEN SATURATION: 100 % | SYSTOLIC BLOOD PRESSURE: 132 MMHG | TEMPERATURE: 98 F

## 2020-08-13 DIAGNOSIS — Z98.890 OTHER SPECIFIED POSTPROCEDURAL STATES: Chronic | ICD-10-CM

## 2020-08-13 DIAGNOSIS — O00.201 RIGHT OVARIAN PREGNANCY WITHOUT INTRAUTERINE PREGNANCY: ICD-10-CM

## 2020-08-13 DIAGNOSIS — Z90.79 ACQUIRED ABSENCE OF OTHER GENITAL ORGAN(S): Chronic | ICD-10-CM

## 2020-08-13 LAB
ALBUMIN SERPL ELPH-MCNC: 4.5 G/DL — SIGNIFICANT CHANGE UP (ref 3.3–5.2)
ALP SERPL-CCNC: 61 U/L — SIGNIFICANT CHANGE UP (ref 40–120)
ALT FLD-CCNC: 24 U/L — SIGNIFICANT CHANGE UP
ANION GAP SERPL CALC-SCNC: 16 MMOL/L — SIGNIFICANT CHANGE UP (ref 5–17)
APTT BLD: 30.7 SEC — SIGNIFICANT CHANGE UP (ref 27.5–35.5)
AST SERPL-CCNC: 22 U/L — SIGNIFICANT CHANGE UP
BASOPHILS # BLD AUTO: 0.05 K/UL — SIGNIFICANT CHANGE UP (ref 0–0.2)
BASOPHILS # BLD AUTO: 0.06 K/UL — SIGNIFICANT CHANGE UP (ref 0–0.2)
BASOPHILS NFR BLD AUTO: 0.3 % — SIGNIFICANT CHANGE UP (ref 0–2)
BASOPHILS NFR BLD AUTO: 0.5 % — SIGNIFICANT CHANGE UP (ref 0–2)
BILIRUB SERPL-MCNC: 0.2 MG/DL — LOW (ref 0.4–2)
BLD GP AB SCN SERPL QL: SIGNIFICANT CHANGE UP
BUN SERPL-MCNC: 10 MG/DL — SIGNIFICANT CHANGE UP (ref 8–20)
CALCIUM SERPL-MCNC: 9 MG/DL — SIGNIFICANT CHANGE UP (ref 8.6–10.2)
CHLORIDE SERPL-SCNC: 99 MMOL/L — SIGNIFICANT CHANGE UP (ref 98–107)
CO2 SERPL-SCNC: 22 MMOL/L — SIGNIFICANT CHANGE UP (ref 22–29)
CREAT SERPL-MCNC: 0.58 MG/DL — SIGNIFICANT CHANGE UP (ref 0.5–1.3)
EOSINOPHIL # BLD AUTO: 0 K/UL — SIGNIFICANT CHANGE UP (ref 0–0.5)
EOSINOPHIL # BLD AUTO: 0.36 K/UL — SIGNIFICANT CHANGE UP (ref 0–0.5)
EOSINOPHIL NFR BLD AUTO: 0 % — SIGNIFICANT CHANGE UP (ref 0–6)
EOSINOPHIL NFR BLD AUTO: 3 % — SIGNIFICANT CHANGE UP (ref 0–6)
GLUCOSE SERPL-MCNC: 116 MG/DL — HIGH (ref 70–99)
HCG SERPL-ACNC: 372.1 MIU/ML — HIGH
HCT VFR BLD CALC: 33.2 % — LOW (ref 34.5–45)
HCT VFR BLD CALC: 36.3 % — SIGNIFICANT CHANGE UP (ref 34.5–45)
HGB BLD-MCNC: 10.9 G/DL — LOW (ref 11.5–15.5)
HGB BLD-MCNC: 11.9 G/DL — SIGNIFICANT CHANGE UP (ref 11.5–15.5)
IMM GRANULOCYTES NFR BLD AUTO: 0.5 % — SIGNIFICANT CHANGE UP (ref 0–1.5)
IMM GRANULOCYTES NFR BLD AUTO: 0.5 % — SIGNIFICANT CHANGE UP (ref 0–1.5)
INR BLD: 1.02 RATIO — SIGNIFICANT CHANGE UP (ref 0.88–1.16)
LACTATE BLDV-MCNC: 1.6 MMOL/L — SIGNIFICANT CHANGE UP (ref 0.5–2)
LYMPHOCYTES # BLD AUTO: 1.33 K/UL — SIGNIFICANT CHANGE UP (ref 1–3.3)
LYMPHOCYTES # BLD AUTO: 27.1 % — SIGNIFICANT CHANGE UP (ref 13–44)
LYMPHOCYTES # BLD AUTO: 3.21 K/UL — SIGNIFICANT CHANGE UP (ref 1–3.3)
LYMPHOCYTES # BLD AUTO: 7.7 % — LOW (ref 13–44)
MCHC RBC-ENTMCNC: 29.9 PG — SIGNIFICANT CHANGE UP (ref 27–34)
MCHC RBC-ENTMCNC: 30 PG — SIGNIFICANT CHANGE UP (ref 27–34)
MCHC RBC-ENTMCNC: 32.8 GM/DL — SIGNIFICANT CHANGE UP (ref 32–36)
MCHC RBC-ENTMCNC: 32.8 GM/DL — SIGNIFICANT CHANGE UP (ref 32–36)
MCV RBC AUTO: 91.2 FL — SIGNIFICANT CHANGE UP (ref 80–100)
MCV RBC AUTO: 91.5 FL — SIGNIFICANT CHANGE UP (ref 80–100)
MONOCYTES # BLD AUTO: 0.15 K/UL — SIGNIFICANT CHANGE UP (ref 0–0.9)
MONOCYTES # BLD AUTO: 0.53 K/UL — SIGNIFICANT CHANGE UP (ref 0–0.9)
MONOCYTES NFR BLD AUTO: 0.9 % — LOW (ref 2–14)
MONOCYTES NFR BLD AUTO: 4.5 % — SIGNIFICANT CHANGE UP (ref 2–14)
NEUTROPHILS # BLD AUTO: 15.59 K/UL — HIGH (ref 1.8–7.4)
NEUTROPHILS # BLD AUTO: 7.63 K/UL — HIGH (ref 1.8–7.4)
NEUTROPHILS NFR BLD AUTO: 64.4 % — SIGNIFICANT CHANGE UP (ref 43–77)
NEUTROPHILS NFR BLD AUTO: 90.6 % — HIGH (ref 43–77)
PLATELET # BLD AUTO: 289 K/UL — SIGNIFICANT CHANGE UP (ref 150–400)
PLATELET # BLD AUTO: 318 K/UL — SIGNIFICANT CHANGE UP (ref 150–400)
POTASSIUM SERPL-MCNC: 3.6 MMOL/L — SIGNIFICANT CHANGE UP (ref 3.5–5.3)
POTASSIUM SERPL-SCNC: 3.6 MMOL/L — SIGNIFICANT CHANGE UP (ref 3.5–5.3)
PROT SERPL-MCNC: 7.5 G/DL — SIGNIFICANT CHANGE UP (ref 6.6–8.7)
PROTHROM AB SERPL-ACNC: 11.8 SEC — SIGNIFICANT CHANGE UP (ref 10.6–13.6)
RBC # BLD: 3.63 M/UL — LOW (ref 3.8–5.2)
RBC # BLD: 3.98 M/UL — SIGNIFICANT CHANGE UP (ref 3.8–5.2)
RBC # FLD: 13.5 % — SIGNIFICANT CHANGE UP (ref 10.3–14.5)
RBC # FLD: 13.6 % — SIGNIFICANT CHANGE UP (ref 10.3–14.5)
SARS-COV-2 RNA SPEC QL NAA+PROBE: SIGNIFICANT CHANGE UP
SODIUM SERPL-SCNC: 137 MMOL/L — SIGNIFICANT CHANGE UP (ref 135–145)
WBC # BLD: 11.85 K/UL — HIGH (ref 3.8–10.5)
WBC # BLD: 17.21 K/UL — HIGH (ref 3.8–10.5)
WBC # FLD AUTO: 11.85 K/UL — HIGH (ref 3.8–10.5)
WBC # FLD AUTO: 17.21 K/UL — HIGH (ref 3.8–10.5)

## 2020-08-13 PROCEDURE — 86850 RBC ANTIBODY SCREEN: CPT

## 2020-08-13 PROCEDURE — 76856 US EXAM PELVIC COMPLETE: CPT

## 2020-08-13 PROCEDURE — 96374 THER/PROPH/DIAG INJ IV PUSH: CPT

## 2020-08-13 PROCEDURE — 84702 CHORIONIC GONADOTROPIN TEST: CPT

## 2020-08-13 PROCEDURE — 85730 THROMBOPLASTIN TIME PARTIAL: CPT

## 2020-08-13 PROCEDURE — 96375 TX/PRO/DX INJ NEW DRUG ADDON: CPT

## 2020-08-13 PROCEDURE — 76830 TRANSVAGINAL US NON-OB: CPT

## 2020-08-13 PROCEDURE — 99285 EMERGENCY DEPT VISIT HI MDM: CPT | Mod: 25

## 2020-08-13 PROCEDURE — 86900 BLOOD TYPING SEROLOGIC ABO: CPT

## 2020-08-13 PROCEDURE — 80053 COMPREHEN METABOLIC PANEL: CPT

## 2020-08-13 PROCEDURE — 86901 BLOOD TYPING SEROLOGIC RH(D): CPT

## 2020-08-13 PROCEDURE — 85610 PROTHROMBIN TIME: CPT

## 2020-08-13 PROCEDURE — 36415 COLL VENOUS BLD VENIPUNCTURE: CPT

## 2020-08-13 PROCEDURE — 83605 ASSAY OF LACTIC ACID: CPT

## 2020-08-13 PROCEDURE — 85027 COMPLETE CBC AUTOMATED: CPT

## 2020-08-13 PROCEDURE — 99285 EMERGENCY DEPT VISIT HI MDM: CPT

## 2020-08-13 PROCEDURE — 96376 TX/PRO/DX INJ SAME DRUG ADON: CPT

## 2020-08-13 PROCEDURE — 76830 TRANSVAGINAL US NON-OB: CPT | Mod: 26

## 2020-08-13 PROCEDURE — 88305 TISSUE EXAM BY PATHOLOGIST: CPT

## 2020-08-13 PROCEDURE — 96361 HYDRATE IV INFUSION ADD-ON: CPT

## 2020-08-13 PROCEDURE — 87635 SARS-COV-2 COVID-19 AMP PRB: CPT

## 2020-08-13 PROCEDURE — 88305 TISSUE EXAM BY PATHOLOGIST: CPT | Mod: 26

## 2020-08-13 PROCEDURE — 76856 US EXAM PELVIC COMPLETE: CPT | Mod: 26

## 2020-08-13 RX ORDER — SODIUM CHLORIDE 9 MG/ML
1000 INJECTION INTRAMUSCULAR; INTRAVENOUS; SUBCUTANEOUS ONCE
Refills: 0 | Status: COMPLETED | OUTPATIENT
Start: 2020-08-13 | End: 2020-08-13

## 2020-08-13 RX ORDER — HYDROMORPHONE HYDROCHLORIDE 2 MG/ML
0.5 INJECTION INTRAMUSCULAR; INTRAVENOUS; SUBCUTANEOUS ONCE
Refills: 0 | Status: DISCONTINUED | OUTPATIENT
Start: 2020-08-13 | End: 2020-08-13

## 2020-08-13 RX ORDER — SODIUM CHLORIDE 9 MG/ML
1000 INJECTION, SOLUTION INTRAVENOUS
Refills: 0 | Status: DISCONTINUED | OUTPATIENT
Start: 2020-08-13 | End: 2020-08-13

## 2020-08-13 RX ORDER — FENTANYL CITRATE 50 UG/ML
50 INJECTION INTRAVENOUS
Refills: 0 | Status: DISCONTINUED | OUTPATIENT
Start: 2020-08-13 | End: 2020-08-13

## 2020-08-13 RX ORDER — HYDROMORPHONE HYDROCHLORIDE 2 MG/ML
1 INJECTION INTRAMUSCULAR; INTRAVENOUS; SUBCUTANEOUS ONCE
Refills: 0 | Status: DISCONTINUED | OUTPATIENT
Start: 2020-08-13 | End: 2020-08-13

## 2020-08-13 RX ORDER — ONDANSETRON 8 MG/1
4 TABLET, FILM COATED ORAL ONCE
Refills: 0 | Status: DISCONTINUED | OUTPATIENT
Start: 2020-08-13 | End: 2020-08-13

## 2020-08-13 RX ORDER — ONDANSETRON 8 MG/1
4 TABLET, FILM COATED ORAL ONCE
Refills: 0 | Status: COMPLETED | OUTPATIENT
Start: 2020-08-13 | End: 2020-08-13

## 2020-08-13 RX ADMIN — HYDROMORPHONE HYDROCHLORIDE 0.5 MILLIGRAM(S): 2 INJECTION INTRAMUSCULAR; INTRAVENOUS; SUBCUTANEOUS at 03:50

## 2020-08-13 RX ADMIN — HYDROMORPHONE HYDROCHLORIDE 1 MILLIGRAM(S): 2 INJECTION INTRAMUSCULAR; INTRAVENOUS; SUBCUTANEOUS at 03:50

## 2020-08-13 RX ADMIN — HYDROMORPHONE HYDROCHLORIDE 0.5 MILLIGRAM(S): 2 INJECTION INTRAMUSCULAR; INTRAVENOUS; SUBCUTANEOUS at 02:30

## 2020-08-13 RX ADMIN — HYDROMORPHONE HYDROCHLORIDE 1 MILLIGRAM(S): 2 INJECTION INTRAMUSCULAR; INTRAVENOUS; SUBCUTANEOUS at 02:49

## 2020-08-13 RX ADMIN — SODIUM CHLORIDE 1000 MILLILITER(S): 9 INJECTION INTRAMUSCULAR; INTRAVENOUS; SUBCUTANEOUS at 02:30

## 2020-08-13 RX ADMIN — SODIUM CHLORIDE 125 MILLILITER(S): 9 INJECTION, SOLUTION INTRAVENOUS at 04:29

## 2020-08-13 RX ADMIN — SODIUM CHLORIDE 1000 MILLILITER(S): 9 INJECTION INTRAMUSCULAR; INTRAVENOUS; SUBCUTANEOUS at 03:36

## 2020-08-13 RX ADMIN — ONDANSETRON 4 MILLIGRAM(S): 8 TABLET, FILM COATED ORAL at 02:31

## 2020-08-13 NOTE — ED ADULT NURSE NOTE - OBJECTIVE STATEMENT
Chief complaint of right lower quadrant pain. +Nausea. Denies vomiting, and diarrhea. Abdomen soft, tender, non-distended. Pt appears uncomfortable, unable to sit still, holding abdomen. Pt alert and oriented x3, respirations even and unlabored, skin warm and dry, color appropriate for ethnicity, speech clear, moving extremities. Updated pt on plan of care. Will continue to monitor.

## 2020-08-13 NOTE — ED PROVIDER NOTE - OBJECTIVE STATEMENT
30 F pt with PMHx of enlarged ovary, asthma, ectopic pregnancy, 2 miscarriages presents to the ED Left ovarian torsion vs ectopic pregnancy in 2019 s/p left salpingectomy c/o intermittent right lower abdominal pain x 4 days worsening tonight. Pt notes pain worsened around 8 pm to 10/10 in intensity with one episode of nb/nb vomiting. Notes stabbing pain. Last bowel movement 4 pm yesterday. Denies fevers, chills, vaginal discharge, vaginal bleeding, diarrhea, constipation, BRBPR. Unsure of pregnancy status.

## 2020-08-13 NOTE — ASU DISCHARGE PLAN (ADULT/PEDIATRIC) - CARE PROVIDER_API CALL
Josee [place] [address]  Phone: [phone]  Fax: [phone]  Follow Up Time:

## 2020-08-13 NOTE — ED PROVIDER NOTE - CHPI ED SYMPTOMS NEG
no blood in stool/no dysuria/no burning urination/no chills/no abdominal distension/no fever/no diarrhea

## 2020-08-13 NOTE — ASU DISCHARGE PLAN (ADULT/PEDIATRIC) - CALL YOUR DOCTOR IF YOU HAVE ANY OF THE FOLLOWING:
Bleeding that does not stop/Nausea and vomiting that does not stop/Inability to tolerate liquids or foods/Pain not relieved by Medications

## 2020-08-13 NOTE — H&P ADULT - NSHPPHYSICALEXAM_GEN_ALL_CORE
Vital Signs Last 24 Hrs  T(C): 36.9 (13 Aug 2020 01:52), Max: 36.9 (13 Aug 2020 01:52)  T(F): 98.4 (13 Aug 2020 01:52), Max: 98.4 (13 Aug 2020 01:52)  HR: 87 (13 Aug 2020 01:52) (87 - 87)  BP: 132/87 (13 Aug 2020 01:52) (132/87 - 132/87)  RR: 18 (13 Aug 2020 01:52) (18 - 18)  SpO2: 100% (13 Aug 2020 01:52) (100% - 100%)    Gen: NAD, AOx3  Abd: soft, guarding and rebounding in RLQ, TTP in RLQ, no masses palpated

## 2020-08-13 NOTE — BRIEF OPERATIVE NOTE - NSICDXBRIEFPROCEDURE_GEN_ALL_CORE_FT
PROCEDURES:  Laparoscopic lysis of adhesions with salpingectomy 13-Aug-2020 07:17:20  Chuck Acosta  Evacuation of hemoperitoneum 13-Aug-2020 07:17:13  Chuck Acosta  Laparoscopic right salpingectomy for ectopic pregnancy 13-Aug-2020 07:17:08  Chuck Acosta

## 2020-08-13 NOTE — H&P ADULT - NSICDXFAMILYHX_GEN_ALL_CORE_FT
FAMILY HISTORY:  Family history of diabetes mellitus Breath Sounds equal & clear to percussion & auscultation, no accessory muscle use

## 2020-08-13 NOTE — PROGRESS NOTE ADULT - ASSESSMENT
A/P:  31yo now POD# s/p right salpingectomy due to ruptured ectopic pregnancy.  Gen: VSS  Neuro: Pain well controlled on current regimen  CV: no acute cardiac complaints  Pulm: Incentive spirometer use encouraged  GI: Bowel sounds/function normal, tolerating PO diet  : Day removed, voiding spontaneously  Heme: STAT labs pending prior to discharge  DVT ppx: Ambulation encouraged no Anticoagulation prophylaxis needed at this time.  Dispo: Discharge pending CBC. POst op care of incision sites discussed with patient. Will f/u with Dr. Ordonez in office.    Sapna Nascimento, MS4 A/P:  29yo now POD#0 s/p right salpingectomy due to ruptured ectopic pregnancy.  Gen: VSS  Neuro: Pain symptoms likely 2/2 gas from insufflation, otherwise incision pain is moderately controlled with current regimen.  CV: no acute cardiac complaints  Pulm: Incentive spirometer use encouraged  GI: Bowel sounds/function normal, tolerating PO diet  : Day removed, voiding spontaneously  Heme: STAT labs pending prior to discharge  DVT ppx: Ambulation encouraged no Anticoagulation prophylaxis needed at this time.  Dispo: Discharge pending CBC. Post op care of incision sites discussed with patient. Will f/u with Dr. Ordonez in office.    Sapna Nascimento, MS4

## 2020-08-13 NOTE — H&P ADULT - HISTORY OF PRESENT ILLNESS
31yo  at 3w1d by LMP 20 with history of L ectopic pregnancy in 2019  presenting to ED with complaints of sharp RLQ abdominal pain. No aggravating factors or alleviation with OTC NSAIDs. Patient did not know she was pregnant. Endorses some vaginal bleeding after undergoing the transvaginal sonogram today. Endorses some nausea.   Denies any headache, blurry vision, CP, SOB, fever, chills, dysuria or changes in bowel habits.    Patient's OBGYN is Dr. Grey Sheldon in [facility].    PGYNHx: No history of STDs or ovarian cysts. She had an ex lap, left salpingectomy and myomectomy in 2019.    POBHx: Ectopic x1, SAB x2

## 2020-08-13 NOTE — H&P ADULT - NSICDXPASTSURGICALHX_GEN_ALL_CORE_FT
PAST SURGICAL HISTORY:  H/O exploratory laparotomy     H/O myomectomy     History of unilateral salpingectomy left

## 2020-08-13 NOTE — BRIEF OPERATIVE NOTE - OPERATION/FINDINGS
~350cc of hemoperitoneum including clots, ectopic pregnancy in right fallopian tube, several string-like adhesions from bowel to anterior abdominal wall ligated with ligasure, bowel and omentum adhered to the right of the umbilicus. Left ovarian cyst noted.

## 2020-08-13 NOTE — ED PROVIDER NOTE - PROGRESS NOTE DETAILS
POLO- PT brought down to ultrasound RAD ESPINO Consulted, will come see patient, POLO- OBGYN Consulted, will come see patient, u/s shows Large amount of hemorrhagic clot in the right adnexa surrounding the right ovary. Given the lack of visualized IUP and degree of right adnexal hemorrhage, this raises the possibility of a nonvisualized ruptured right tubal ectopic pregnancy. Differential includes ruptured hemorrhagic cyst of the right ovary.

## 2020-08-13 NOTE — PROGRESS NOTE ADULT - SUBJECTIVE AND OBJECTIVE BOX
LORIN KRAFT is a 29yo now POD# s/p right salpingectomy for ruptured ectopic pregnancy.    S:    Patient was seen and examined at bedside with Dr. Sorensen.   Patient complains of back, shoulder, and abdominal pain and pain with deep inspiration.  Pain is controlled with current treatment regimen.   Tolerating regular diet, denies N/V.   Ambulating without difficulty.   + flatus/-BM/+ voiding    O:   T(C): 36.9 (08-13-20 @ 08:45), Max: 37.2 (08-13-20 @ 04:30)  HR: 60 (08-13-20 @ 09:12) (58 - 87)  BP: 104/72 (08-13-20 @ 09:12) (97/59 - 132/87)  RR: 17 (08-13-20 @ 09:12) (14 - 18)  SpO2: 100% (08-13-20 @ 09:12) (98% - 100%)    Gen: NAD, AOx3  CV: RRR  Pulm: CTAB  Abdomen: Soft, nondistended, appropriately tender, + BS   Incision: Clean, dry and intact  Extremities: No calf tenderness or edema     Labs:                           11.9   11.85 )-----------( 318      ( 13 Aug 2020 02:47 )             36.3 LORIN KRAFT is a 29yo now POD#0 s/p right salpingectomy for ruptured ectopic pregnancy.    S:    Patient was seen and examined at bedside with Dr. Sorensen.   Patient complains of back, shoulder, and abdominal pain and pain with deep inspiration.  Pain is controlled with current treatment regimen.   Tolerating regular diet, denies N/V.   Ambulating without difficulty.   + flatus/-BM/+ voiding    O:   T(C): 36.9 (08-13-20 @ 08:45), Max: 37.2 (08-13-20 @ 04:30)  HR: 60 (08-13-20 @ 09:12) (58 - 87)  BP: 104/72 (08-13-20 @ 09:12) (97/59 - 132/87)  RR: 17 (08-13-20 @ 09:12) (14 - 18)  SpO2: 100% (08-13-20 @ 09:12) (98% - 100%)    Gen: NAD, AOx3  CV: RRR  Pulm: CTAB  Abdomen: Soft, nondistended, appropriately tender, + BS   Incision: Clean, dry and intact  Extremities: No calf tenderness or edema     Labs:                           11.9   11.85 )-----------( 318      ( 13 Aug 2020 02:47 )             36.3

## 2020-08-13 NOTE — ED PROVIDER NOTE - CLINICAL SUMMARY MEDICAL DECISION MAKING FREE TEXT BOX
30 F pt with PMHx of enlarged ovary, asthma, ectopic pregnancy, 2 miscarriages presents to the ED Left ovarian torsion vs ectopic pregnancy in 2019 s/p left salpingectomy c/o intermittent right lower abdominal pain x 4 days worsening tonight. ectopic vs torsion, labs, meds, stat ultraosund

## 2020-08-13 NOTE — ED PROVIDER NOTE - ATTENDING CONTRIBUTION TO CARE
Sage: I performed a face to face bedside interview with patient regarding history of present illness, review of symptoms and past medical history. I completed an independent physical exam.  I have discussed patient's plan of care with advanced care provider.   I agree with note as stated above including HISTORY OF PRESENT ILLNESS, HIV, PAST MEDICAL/SURGICAL/FAMILY/SOCIAL HISTORY, ALLERGIES AND HOME MEDICATIONS, REVIEW OF SYSTEMS, PHYSICAL EXAM, MEDICAL DECISION MAKING and any PROGRESS NOTES during the time I functioned as the attending physician for this patient  unless otherwise noted. My brief assessment is as follows: 30F h/o L ovarian torsion s/p salpingectomy p/w intermittent R pelvic pain x 4 days, constant since 2000. +vomiting.   Gen: Well appearing in moderate distress 2/2 pain  Head: NC/AT  Neck: trachea midline  Resp:  No distress, CTAB  CV: RRR  GI: soft, +R pelvic ttp, +guarding, +rebound  Ext: no deformities  Neuro:  A&O appears non focal  Skin:  Warm and dry as visualized  Psych:  Normal affect and mood   Concern for torsion vs ectopic. Plan for labs, IVF, going straight to US, pain meds. Likely OB consult.

## 2020-08-13 NOTE — BRIEF OPERATIVE NOTE - NSICDXBRIEFPOSTOP_GEN_ALL_CORE_FT
POST-OP DIAGNOSIS:  Left ovarian cyst 13-Aug-2020 07:17:57  Chuck Acosta  Ectopic pregnancy 13-Aug-2020 07:17:31  Chuck Acosta

## 2020-08-13 NOTE — H&P ADULT - NSHPLABSRESULTS_GEN_ALL_CORE
Complete Blood Count + Automated Diff (20 @ 02:47)    WBC Count: 11.85 K/uL    RBC Count: 3.98 M/uL    Hemoglobin: 11.9 g/dL    Hematocrit: 36.3 %    Mean Cell Volume: 91.2 fl    Mean Cell Hemoglobin: 29.9 pg    Mean Cell Hemoglobin Conc: 32.8 gm/dL    Red Cell Distrib Width: 13.6 %    Platelet Count - Automated: 318 K/uL    Auto Neutrophil #: 7.63 K/uL    Auto Lymphocyte #: 3.21 K/uL    Auto Monocyte #: 0.53 K/uL    Auto Eosinophil #: 0.36 K/uL    Auto Basophil #: 0.06 K/uL    Auto Neutrophil %: 64.4: Differential percentages must be correlated with absolute numbers for  clinical significance. %    Auto Lymphocyte %: 27.1 %    Auto Monocyte %: 4.5 %    Auto Eosinophil %: 3.0 %    Auto Basophil %: 0.5 %    Auto Immature Granulocyte %: 0.5 %    Type + Screen (20 @ 02:52)    ABO RH Interpretation: A POS    HCG Quantitative, Serum (20 @ 02:47)    HCG Quantitative, Serum: 372.1    < from: US Transvaginal (20 @ 03:48) >    PROCEDURE DATE:  2020          INTERPRETATION:  CLINICAL INFORMATION: Right-sided pelvic pain and vaginal bleeding.    LMP: 2020  Beta-HC  COMPARISON: None available.    TECHNIQUE:  Endovaginal and transabdominal pelvic sonogram. Color and Spectral Doppler was performed.    FINDINGS:    Uterus: Anteverted uterus measures 10.2 cm. A fundal fibroid measures 1.6 cm..  Endometrium: 8 mm. Within normal limits. No intrauterine gestation is seen.    Right ovary: 3.1 x 2.0 x 3.4 cm. Normal arterial and venous waveforms. A large amount of complex material surrounds the right ovary compatible with blood clot. No discrete adnexal mass isseen, possibly due to the degree of surrounding hemorrhage.  Left ovary: A 4.0 x 3.6 cm cyst with mild complexity in the left adnexa may represent a left ovarian cyst, however, left ovary is not discretely visualized.    Fluid: Pelvic hemoperitoneum.A large amount of clot in the right adnexa.    IMPRESSION:  Pregnancy of unknown location.    Large amount of hemorrhagic clot in the right adnexa surrounding the right ovary. Given the lack of visualized IUP and degree of right adnexal hemorrhage, this raises the possibility of a nonvisualized ruptured right tubal ectopic pregnancy. Differential includes ruptured hemorrhagic cyst of the right ovary.    Left adnexal cyst may be arising from the left ovary, however, left ovary is not discretely visualized.    Findings were discussed with Dr. ZAPATA 2020 3:56 AM by Dr. Colón with read back confirmation.    < end of copied text >

## 2020-08-13 NOTE — ED ADULT NURSE NOTE - CHPI ED NUR SYMPTOMS NEG
no chills/no fever/no diarrhea/no dysuria/no hematuria/no abdominal distension/no blood in stool/no burning urination

## 2020-08-13 NOTE — H&P ADULT - ASSESSMENT
31yo  at 3w1d by LMP 20 admitted with suspected right ectopic pregnancy vs. hemorrhagic cyst. [facility] 372. Given patient's history of left ectopic pregnancy and acute abdomen, will take patient back for diagnostic laparoscopy, possible right salpingectomy.   Patient understands that this procedure will prevent her from having children conceived naturally.     Admit to OBGYN  Admission Labs  COVID19 PCR  NPO  LR @ 125cc/hr  Diagnostic laparoscopy, possible right salpingectomy    d/w Dr. Ordonez 31yo  at 3w1d by LMP 20 admitted with suspected right ectopic pregnancy vs. hemorrhagic cyst. [facility] 372. Given patient's history of left ectopic pregnancy and acute abdomen, will take patient back for diagnostic laparoscopy, possible right salpingectomy.   Patient understands that this procedure will prevent her from having children conceived naturally.     Admit to OBGYN  Admission Labs  COVID19 PCR  NPO  LR @ 125cc/hr  Emergent Diagnostic laparoscopy, possible right salpingectomy  Due to emergence of case, will not wait for COVID19 PCR    d/w Dr. Ordonez

## 2020-08-17 LAB — SURGICAL PATHOLOGY STUDY: SIGNIFICANT CHANGE UP

## 2020-10-01 ENCOUNTER — ASOB RESULT (OUTPATIENT)
Age: 30
End: 2020-10-01

## 2020-10-01 ENCOUNTER — APPOINTMENT (OUTPATIENT)
Dept: ANTEPARTUM | Facility: CLINIC | Age: 30
End: 2020-10-01
Payer: MEDICAID

## 2020-10-01 PROBLEM — J45.909 UNSPECIFIED ASTHMA, UNCOMPLICATED: Chronic | Status: ACTIVE | Noted: 2020-08-13

## 2020-10-01 PROCEDURE — 76856 US EXAM PELVIC COMPLETE: CPT | Mod: 59

## 2020-10-01 PROCEDURE — 76830 TRANSVAGINAL US NON-OB: CPT

## 2020-12-03 ENCOUNTER — APPOINTMENT (OUTPATIENT)
Dept: ANTEPARTUM | Facility: CLINIC | Age: 30
End: 2020-12-03

## 2021-03-11 ENCOUNTER — ASOB RESULT (OUTPATIENT)
Age: 31
End: 2021-03-11

## 2021-03-11 ENCOUNTER — APPOINTMENT (OUTPATIENT)
Dept: ANTEPARTUM | Facility: CLINIC | Age: 31
End: 2021-03-11
Payer: MEDICAID

## 2021-03-11 PROCEDURE — 99072 ADDL SUPL MATRL&STAF TM PHE: CPT

## 2021-03-11 PROCEDURE — 76830 TRANSVAGINAL US NON-OB: CPT

## 2021-03-11 PROCEDURE — 76856 US EXAM PELVIC COMPLETE: CPT | Mod: 59

## 2021-03-16 ENCOUNTER — APPOINTMENT (OUTPATIENT)
Dept: ANTEPARTUM | Facility: CLINIC | Age: 31
End: 2021-03-16

## 2021-06-14 NOTE — PROGRESS NOTE ADULT - ATTENDING COMMENTS
Addended by: CHEL MADDOX on: 6/14/2021 08:54 AM     Modules accepted: Orders     28 yrs old female with large L adnexal mass planned for OR today ,   labs , CXR reviewed , no sob , no cp ,  medically optimized for planned surgery .

## 2022-11-22 NOTE — ED PROVIDER NOTE - NS ED MD DISPO DIVISION
[facility] [employer] Counseling:  I discussed with the patient the risks of mycophenolate mofetil including but not limited to infection/immunosuppression, GI upset, hypokalemia, hypercholesterolemia, bone marrow suppression, lymphoproliferative disorders, malignancy, GI ulceration/bleed/perforation, colitis, interstitial lung disease, kidney failure, progressive multifocal leukoencephalopathy, and birth defects.  The patient understands that monitoring is required including a baseline creatinine and regular CBC testing. In addition, patient must alert us immediately if symptoms of infection or other concerning signs are noted.

## 2024-01-28 ENCOUNTER — NON-APPOINTMENT (OUTPATIENT)
Age: 34
End: 2024-01-28

## 2024-05-13 NOTE — ED ADULT NURSE NOTE - NS PRO PASSIVE SMOKE EXP
Received denial for acyclovir 5% ointment from [facility] Medicare Rx via fax. Placed in RN folder. Please advise, thank you.    No

## 2025-01-13 NOTE — PATIENT PROFILE ADULT - NSPROEXTENSIONSOFSELF_GEN_A_NUR
none
Patient evaluated and seen with BOB Palma as a shared visit - agree with above history and physical - pt examined and seen by me personally - findings as seen: Pt evaluated for abd pain  -

## 2025-01-23 ENCOUNTER — APPOINTMENT (OUTPATIENT)
Dept: ULTRASOUND IMAGING | Facility: CLINIC | Age: 35
End: 2025-01-23
Payer: COMMERCIAL

## 2025-01-23 PROCEDURE — 76700 US EXAM ABDOM COMPLETE: CPT

## 2025-02-21 ENCOUNTER — APPOINTMENT (OUTPATIENT)
Dept: ULTRASOUND IMAGING | Facility: CLINIC | Age: 35
End: 2025-02-21
Payer: COMMERCIAL

## 2025-02-21 PROCEDURE — 76981 USE PARENCHYMA: CPT
